# Patient Record
Sex: FEMALE | Race: WHITE | NOT HISPANIC OR LATINO | Employment: OTHER | RURAL
[De-identification: names, ages, dates, MRNs, and addresses within clinical notes are randomized per-mention and may not be internally consistent; named-entity substitution may affect disease eponyms.]

---

## 2022-05-17 ENCOUNTER — OFFICE VISIT (OUTPATIENT)
Dept: FAMILY MEDICINE CLINIC | Facility: CLINIC | Age: 67
End: 2022-05-17

## 2022-05-17 VITALS
TEMPERATURE: 97.8 F | HEIGHT: 63 IN | OXYGEN SATURATION: 99 % | HEART RATE: 54 BPM | DIASTOLIC BLOOD PRESSURE: 68 MMHG | RESPIRATION RATE: 20 BRPM | WEIGHT: 129.4 LBS | BODY MASS INDEX: 22.93 KG/M2 | SYSTOLIC BLOOD PRESSURE: 130 MMHG

## 2022-05-17 DIAGNOSIS — Z76.89 ENCOUNTER TO ESTABLISH CARE: Primary | ICD-10-CM

## 2022-05-17 DIAGNOSIS — E03.9 HYPOTHYROIDISM, UNSPECIFIED TYPE: ICD-10-CM

## 2022-05-17 DIAGNOSIS — M06.9 RHEUMATOID ARTHRITIS, INVOLVING UNSPECIFIED SITE, UNSPECIFIED WHETHER RHEUMATOID FACTOR PRESENT: ICD-10-CM

## 2022-05-17 DIAGNOSIS — I49.9 IRREGULAR HEART BEAT: ICD-10-CM

## 2022-05-17 PROCEDURE — 93005 ELECTROCARDIOGRAM TRACING: CPT | Performed by: NURSE PRACTITIONER

## 2022-05-17 PROCEDURE — 93010 ELECTROCARDIOGRAM REPORT: CPT | Performed by: INTERNAL MEDICINE

## 2022-05-17 PROCEDURE — 99204 OFFICE O/P NEW MOD 45 MIN: CPT | Performed by: NURSE PRACTITIONER

## 2022-05-17 RX ORDER — FUROSEMIDE 40 MG/1
40 TABLET ORAL DAILY
Status: ON HOLD | COMMUNITY
Start: 2021-09-27 | End: 2022-10-12

## 2022-05-17 RX ORDER — CETIRIZINE HYDROCHLORIDE 10 MG/1
10 TABLET ORAL DAILY
COMMUNITY

## 2022-05-17 RX ORDER — LEVOTHYROXINE SODIUM 0.07 MG/1
75 TABLET ORAL DAILY
COMMUNITY
Start: 2022-02-24

## 2022-05-17 RX ORDER — MULTIPLE VITAMINS W/ MINERALS TAB 9MG-400MCG
1 TAB ORAL DAILY
COMMUNITY

## 2022-05-18 LAB
QT INTERVAL: 430 MS
QTC INTERVAL: 499 MS

## 2022-06-15 PROBLEM — I49.9 IRREGULAR HEART BEAT: Status: ACTIVE | Noted: 2022-06-15

## 2022-06-15 PROBLEM — E03.9 HYPOTHYROIDISM: Status: ACTIVE | Noted: 2022-06-15

## 2022-06-15 PROBLEM — M06.9 RHEUMATOID ARTHRITIS: Status: ACTIVE | Noted: 2022-06-15

## 2022-07-01 ENCOUNTER — LAB (OUTPATIENT)
Dept: LAB | Facility: HOSPITAL | Age: 67
End: 2022-07-01

## 2022-07-01 ENCOUNTER — OFFICE VISIT (OUTPATIENT)
Dept: CARDIOLOGY | Facility: CLINIC | Age: 67
End: 2022-07-01

## 2022-07-01 VITALS
SYSTOLIC BLOOD PRESSURE: 134 MMHG | DIASTOLIC BLOOD PRESSURE: 72 MMHG | HEIGHT: 63 IN | OXYGEN SATURATION: 100 % | WEIGHT: 130.4 LBS | BODY MASS INDEX: 23.11 KG/M2 | HEART RATE: 88 BPM

## 2022-07-01 DIAGNOSIS — I49.1 PAC (PREMATURE ATRIAL CONTRACTION): ICD-10-CM

## 2022-07-01 DIAGNOSIS — I49.3 PVC (PREMATURE VENTRICULAR CONTRACTION): ICD-10-CM

## 2022-07-01 DIAGNOSIS — I35.0 NONRHEUMATIC AORTIC VALVE STENOSIS: ICD-10-CM

## 2022-07-01 DIAGNOSIS — I49.9 IRREGULAR HEART BEAT: Primary | ICD-10-CM

## 2022-07-01 DIAGNOSIS — I34.0 NONRHEUMATIC MITRAL VALVE REGURGITATION: ICD-10-CM

## 2022-07-01 LAB
ANION GAP SERPL CALCULATED.3IONS-SCNC: 11 MMOL/L (ref 5–15)
BUN SERPL-MCNC: 7 MG/DL (ref 8–23)
BUN/CREAT SERPL: 11.1 (ref 7–25)
CALCIUM SPEC-SCNC: 8 MG/DL (ref 8.6–10.5)
CHLORIDE SERPL-SCNC: 107 MMOL/L (ref 98–107)
CO2 SERPL-SCNC: 22 MMOL/L (ref 22–29)
CREAT SERPL-MCNC: 0.63 MG/DL (ref 0.57–1)
EGFRCR SERPLBLD CKD-EPI 2021: 96.2 ML/MIN/1.73
GLUCOSE SERPL-MCNC: 108 MG/DL (ref 65–99)
POTASSIUM SERPL-SCNC: 3.6 MMOL/L (ref 3.5–5.2)
SODIUM SERPL-SCNC: 140 MMOL/L (ref 136–145)
TSH SERPL DL<=0.05 MIU/L-ACNC: 0.51 UIU/ML (ref 0.27–4.2)

## 2022-07-01 PROCEDURE — 80048 BASIC METABOLIC PNL TOTAL CA: CPT | Performed by: INTERNAL MEDICINE

## 2022-07-01 PROCEDURE — 99204 OFFICE O/P NEW MOD 45 MIN: CPT | Performed by: INTERNAL MEDICINE

## 2022-07-01 PROCEDURE — 84443 ASSAY THYROID STIM HORMONE: CPT | Performed by: INTERNAL MEDICINE

## 2022-07-01 PROCEDURE — 36415 COLL VENOUS BLD VENIPUNCTURE: CPT | Performed by: INTERNAL MEDICINE

## 2022-07-01 NOTE — PROGRESS NOTES
[facility]  OFFICE NOTE    Cardiovascular Medicine  Roque Carrillo M.D., [facility]         Jolie Maravilla, APRN  [address] [place] [address]    Thank you for asking me to see Umer Frazier for irregular heart beat.    History of Present Illness    Umer Frazier is a 67 y.o. female who presents for consultation today.  She has hypothyroidism, anemia and rheumatoid arthritis per prior documentation.    The patient was referred to our office for further evaluation of irregular heartbeats.  She is currently living with her nephew; [place] in with him ([place] different state[place] in April because she was not able to live independently.  It seems that she was having multiple falls.  Upon my conversation with her today, she reports having multiple falls in her apartment.  She does not recall the exact circumstances of these falls, she would typically wake up on the floor and would not know why she was lying on the floor.  She has not had any such falls or episodes of loss of consciousness while she has been living with her nephew.    She denies having any prior history of coronary stents, heart attacks, open heart surgery, pacemaker/defibrillator placement or heart rhythm problems.  She was told that she had a heart murmur in the past.  Her dad had congestive heart failure.    At present, the patient is able to walk on her own (without assistance with a cane or walker) in her nephew's house.  She denies having any chest discomfort during routine day-to-day activities.  She has had chronic exertional dyspnea, she is not sure if this has gotten worse.  She has not had any PND or orthopnea.  She has had bilateral lower extremity swelling.  She also reports occasional episodes of irregular heartbeats/heart fluttering.  She does not think that she has lost consciousness before; but is not sure.    She had a transthoracic echocardiogram in April 2022 at [facility] in [place].  According to the report, LVEF was 65 to 70%, mild LVH, moderate left atrial dilatation, mild to moderate MR, moderate to severe AS, mild TR and mildly elevated RVSP was noted (by report).    Review of Systems - ROS  Constitution: Negative for weight gain and weight loss.   HENT: Negative for congestion.    Eyes: Negative for blurred vision.   Cardiovascular: As mentioned above  Respiratory: Negative for cough and hemoptysis.    Endocrine: Negative for polydipsia and polyuria.   Hematologic/Lymphatic: Negative for bleeding problem.   Skin: Negative for flushing.   Musculoskeletal: Negative for neck pain and stiffness.   Gastrointestinal: Negative for abdominal pain, nausea and vomiting.   Genitourinary: Negative for dysuria and hematuria.   Neurological: Negative for focal weakness and numbness.   Psychiatric/Behavioral: Negative for altered mental status.      All other systems were reviewed and were negative.    Past Medical History:   Diagnosis Date   • Arthritis    • Hypothyroidism        Family History:  Family history is unknown by patient.    Social History: Drinks alcohol occasionally.  She smokes marijuana every once in a while.  She quit smoking almost 30 years ago.   reports that she quit smoking about 29 years ago. She has never used smokeless tobacco. She reports current alcohol use. She reports previous drug use.    Allergies:  Allergies   Allergen Reactions   • Folic Acid Unknown - Low Severity     Other reaction(s): flu symtoms         Current Outpatient Medications:   •  cetirizine (zyrTEC) 10 MG tablet, Take 10 mg by mouth Daily., Disp: , Rfl:   •  levothyroxine (SYNTHROID, LEVOTHROID) 75 MCG tablet, Take 75 mcg by mouth., Disp: , Rfl:   •  methotrexate 2.5 MG tablet, methotrexate sodium 2.5 mg tablet, Disp: , Rfl:   •  Multiple Vitamins-Minerals (MACULAR HEALTH FORMULA PO), Take  by mouth., Disp: , Rfl:   •  multivitamin with minerals tablet tablet, Take 1 tablet by mouth Daily., Disp: ,  "Rfl:   •  furosemide (LASIX) 40 MG tablet, Take 40 mg by mouth Daily., Disp: , Rfl:     Physical Exam:  Vitals:    07/01/22 1009   BP: 134/72   BP Location: Left arm   Patient Position: Sitting   Cuff Size: Adult   Pulse: 88   SpO2: 100%   Weight: 59.1 kg (130 lb 6.4 oz)   Height: 160 cm (63\")   PainSc: 0-No pain     Current Pain Level: none  Pulse Ox: Normal on room air, seated in wheelchair  General: alert, appears stated age and cooperative     Body Habitus: well-nourished    HEENT: Head: Normocephalic, no lesions, without obvious abnormality.     Neuro: alert, oriented x3  JVP: Volume/Pulsation: Normal.  Normal waveforms.   Appropriate inspiratory decrease.    Carotid Exam: no bruit normal pulsation bilaterally    Respirations: no increased work of breathing   Chest:  Normal    Pulmonary:No crackles or wheezes   Heart rate: normal    Heart Rhythm: regular with periods of irregularity     Heart Sounds: S1: normal  S2: Soft  Systolic murmur heard throughout the precordium; radiation to carotids noted   Abdomen:   Appearance: normal .  Palpation: Soft, bowel sounds positive in all four quadrants  Extremity: 1+ bilateral pitting edema over the legs.  Deformities involving bilateral fingers and toes of bilateral lower extremities noted      DATA REVIEWED:     EKG. I personally reviewed and interpreted the EKG.  normal sinus rhythm, prolonged QT, nonspecific ST abnormality, PACs, PVCs on 5/17/2022    ECG/EMG Results (all)     None        ---------------------------------------------------  TTE/RAMU:      -----------------------------------------------------  CXR/Imaging:   Imaging Results (Most Recent)     None          -----------------------------------------------------  CT:   No radiology results for the last 30 days.    ----------------------------------------------------      --------------------------------------------------------------------------------------------------  LABS:     The CVD Risk score " (Sary et al., 2008) failed to calculate for the following reasons:    Cannot find a previous HDL lab    Cannot find a previous total cholesterol lab         Lab Results   Component Value Date    AST 42 (H) 06/11/2018    ALT 30 06/11/2018     No results found for: WBC, HGB, HCT, MCV, PLT  No results found for: CHOL, CHLPL, TRIG, HDL, LDL, LDLDIRECT  No results found for: TSH, M2LBNDM, R9SLIDB, THYROIDAB  No results found for: CKTOTAL, CKMB, CKMBINDEX, TROPONINI, TROPONINT  No results found for: HGBA1C  No results found for: DDIMER  Lab Results   Component Value Date    ALT 30 06/11/2018     No results found for: HGBA1C  No results found for: GLUF, MICROALBUR, CREATININE  No results found for: IRON, TIBC, FERRITIN  No results found for: INR, PROTIME    [unfilled]    1. Irregular heart beat  - ECG 12 Lead  - Adult Transthoracic Echo Complete W/ Cont if Necessary Per Protocol; Future  - Mobile Cardiac Outpatient Telemetry; Future  - Basic Metabolic Panel  - TSH    2. PAC (premature atrial contraction)  - Adult Transthoracic Echo Complete W/ Cont if Necessary Per Protocol; Future  - Mobile Cardiac Outpatient Telemetry; Future  - Basic Metabolic Panel  - TSH    3. PVC (premature ventricular contraction)  - Adult Transthoracic Echo Complete W/ Cont if Necessary Per Protocol; Future  - Mobile Cardiac Outpatient Telemetry; Future  - Basic Metabolic Panel  - TSH    4. Nonrheumatic aortic valve stenosis  - Adult Transthoracic Echo Complete W/ Cont if Necessary Per Protocol; Future    5. Nonrheumatic mitral valve regurgitation  - Adult Transthoracic Echo Complete W/ Cont if Necessary Per Protocol; Future    The patient is a 67-year-old female with hypothyroidism, anemia and rheumatoid arthritis who was referred to our office for further evaluation of irregular heartbeats.  The patient reports chronic exertional dyspnea, irregular heartbeat/heart flutters and bilateral lower extremity edema.  She has also had  multiple falls over the past few months (thought to be related to weakness or deformities from arthritis); these have fortunately not recurred since she has moved in with her nephew over the past couple of months.  Recent clinic ECG showed sinus rhythm with PACs and PVCs.  QT was prolonged.  She had a transthoracic echocardiogram [facility] in [facility] recently.  According to the report, LVEF was 65 to 70%, mild LVH, moderate left atrial dilatation, mild to moderate MR, moderate to severe AS, mild TR and mildly elevated RVSP was noted (by report).  Given the findings of PACs and PVCs on recent ECG, will obtain serum BMP and TSH levels today.  Repeat transthoracic echocardiography to assess her valvular heart disease. Some of her symptoms may be occurring from aortic valve stenosis or underlying hemodynamically significant cardiac arrhythmias.  We will also place a 2-week live cardiac monitor to look for hemodynamically significant arrhythmias.    Prevention:  BMI is within normal parameters. No other follow-up for BMI required.      Umer Frazier  reports that she quit smoking about 29 years ago. She has never used smokeless tobacco.. I have educated her on continued tobacco cessation.      Return in about 2 months (around 9/1/2022).            Electronically signed by Rqoue Carrillo MD on 07/01/22 at 10:09 CDT

## 2022-07-05 ENCOUNTER — TELEPHONE (OUTPATIENT)
Dept: CARDIOLOGY | Facility: CLINIC | Age: 67
End: 2022-07-05

## 2022-07-05 NOTE — TELEPHONE ENCOUNTER
Called patient. Informed patient of test results. Patient voiced their understandings.       ----- Message from Roque Carrillo MD sent at 7/2/2022  6:46 PM CDT -----  Serum TSH was normal. Serum Cr and GFR also look good.

## 2022-07-27 ENCOUNTER — TELEPHONE (OUTPATIENT)
Dept: CARDIOLOGY | Facility: CLINIC | Age: 67
End: 2022-07-27

## 2022-07-27 NOTE — TELEPHONE ENCOUNTER
Unable to leave        ----- Message from Roque Carrillo MD sent at 7/22/2022  6:53 PM CDT -----  Heart monitor showed frequent PACs and PVCs.   Will follow on the results of echocardiogram to determine further steps.   Any chance we could pre-pone her echocardiogram?

## 2022-08-29 ENCOUNTER — TELEPHONE (OUTPATIENT)
Dept: CARDIOLOGY | Facility: CLINIC | Age: 67
End: 2022-08-29

## 2022-08-29 NOTE — TELEPHONE ENCOUNTER
Called patient. Informed patient of test results. Patient voiced their understandings.       ----- Message from Roque Carrillo MD sent at 8/27/2022  7:33 PM CDT -----  Echocardiogram showed moderate aortic stenosis and mild-moderate mitral regurgitation. Will discuss further at next appt.    
Speaking Coherently

## 2022-09-02 ENCOUNTER — OFFICE VISIT (OUTPATIENT)
Dept: CARDIOLOGY | Facility: CLINIC | Age: 67
End: 2022-09-02

## 2022-09-02 ENCOUNTER — PREP FOR SURGERY (OUTPATIENT)
Dept: OTHER | Facility: HOSPITAL | Age: 67
End: 2022-09-02

## 2022-09-02 VITALS
WEIGHT: 130 LBS | HEART RATE: 51 BPM | DIASTOLIC BLOOD PRESSURE: 72 MMHG | BODY MASS INDEX: 23.04 KG/M2 | HEIGHT: 63 IN | SYSTOLIC BLOOD PRESSURE: 126 MMHG | OXYGEN SATURATION: 97 %

## 2022-09-02 DIAGNOSIS — I35.0 NONRHEUMATIC AORTIC VALVE STENOSIS: ICD-10-CM

## 2022-09-02 DIAGNOSIS — I49.9 IRREGULAR HEART BEAT: Primary | ICD-10-CM

## 2022-09-02 DIAGNOSIS — I49.3 PVC (PREMATURE VENTRICULAR CONTRACTION): ICD-10-CM

## 2022-09-02 DIAGNOSIS — I34.0 NONRHEUMATIC MITRAL VALVE REGURGITATION: ICD-10-CM

## 2022-09-02 DIAGNOSIS — I35.0 NONRHEUMATIC AORTIC VALVE STENOSIS: Primary | ICD-10-CM

## 2022-09-02 DIAGNOSIS — I47.29 NSVT (NONSUSTAINED VENTRICULAR TACHYCARDIA): ICD-10-CM

## 2022-09-02 DIAGNOSIS — R06.09 DYSPNEA ON EXERTION: ICD-10-CM

## 2022-09-02 DIAGNOSIS — I49.1 PAC (PREMATURE ATRIAL CONTRACTION): ICD-10-CM

## 2022-09-02 PROCEDURE — 99214 OFFICE O/P EST MOD 30 MIN: CPT | Performed by: INTERNAL MEDICINE

## 2022-09-02 RX ORDER — SODIUM CHLORIDE 9 MG/ML
50 INJECTION, SOLUTION INTRAVENOUS ONCE
Status: CANCELLED | OUTPATIENT
Start: 2022-09-08 | End: 2022-09-02

## 2022-09-02 RX ORDER — ASPIRIN 325 MG
325 TABLET ORAL ONCE
Status: CANCELLED | OUTPATIENT
Start: 2022-09-08 | End: 2022-09-02

## 2022-09-02 RX ORDER — SODIUM CHLORIDE 0.9 % (FLUSH) 0.9 %
10 SYRINGE (ML) INJECTION AS NEEDED
Status: CANCELLED | OUTPATIENT
Start: 2022-09-08

## 2022-09-02 RX ORDER — ASPIRIN 81 MG/1
81 TABLET ORAL DAILY
Status: CANCELLED | OUTPATIENT
Start: 2022-09-08

## 2022-09-02 RX ORDER — SODIUM CHLORIDE 0.9 % (FLUSH) 0.9 %
3 SYRINGE (ML) INJECTION EVERY 12 HOURS SCHEDULED
Status: CANCELLED | OUTPATIENT
Start: 2022-09-08

## 2022-09-02 NOTE — H&P (VIEW-ONLY)
[facility]  OFFICE NOTE    Cardiovascular Medicine  Roque Carrillo M.D., [facility]         No referring provider defined for this encounter.    History of Present Illness    Umer Frazier is a 67 y.o. female who presents for consultation today.  She has hypothyroidism, anemia and rheumatoid arthritis per prior documentation.    The patient was referred to our office for further evaluation of irregular heartbeats.  She is currently living with her nephew; moved in with him ([place] in April because she was not able to live independently.  It seems that she was having multiple falls.  Upon my conversation with her today, she reports having multiple falls in her apartment.  She does not recall the exact circumstances of these falls, she would typically wake up on the floor and would not know why she was lying on the floor.  She has not had any such falls or episodes of loss of consciousness while she has been living with her nephew.    She denies having any prior history of coronary stents, heart attacks, open heart surgery, pacemaker/defibrillator placement or heart rhythm problems.  She was told that she had a heart murmur in the past.  Her dad had congestive heart failure.    At present, the patient is able to walk on her own (without assistance with a cane or walker) in her nephew's house.  She denies having any chest discomfort during routine day-to-day activities.  She has had chronic exertional dyspnea, she is not sure if this has gotten worse.  She has not had any PND or orthopnea.  She has had bilateral lower extremity swelling.  She also reports occasional episodes of irregular heartbeats/heart fluttering.  She does not think that she has lost consciousness before; but is not sure.    She had a transthoracic echocardiogram in April 2022 at [facility] in [facility].  According to the report, LVEF was 65 to 70%, mild LVH, moderate left atrial  dilatation, mild to moderate MR, moderate to severe AS, mild TR and mildly elevated RVSP was noted (by report).    9/2/2022:  She presented today for a follow-up visit accompanied by her nephew.  Results of recent transthoracic echocardiogram and heart monitor were extensively reviewed with them.  She continues to have significant exertional dyspnea.  She quit smoking in 1990s.  She has not had any chest discomfort.  Since our last visit, she has not had any palpitations, presyncope or syncope.  She also denies any any PND or orthopnea.  She does report feeling off balance frequently, but has not had any falls since she moved in with her nephew.    Review of Systems - ROS  Constitution: Negative for weight gain and weight loss.   HENT: Negative for congestion.    Eyes: Negative for blurred vision.   Cardiovascular: As mentioned above  Respiratory: Negative for cough and hemoptysis.    Endocrine: Negative for polydipsia and polyuria.   Hematologic/Lymphatic: Negative for bleeding problem.   Skin: Negative for flushing.   Musculoskeletal: Negative for neck pain and stiffness.   Gastrointestinal: Negative for abdominal pain, nausea and vomiting.   Genitourinary: Negative for dysuria and hematuria.   Neurological: Negative for focal weakness and numbness.   Psychiatric/Behavioral: Negative for altered mental status.      All other systems were reviewed and were negative.    Past Medical History:   Diagnosis Date   • Arthritis    • Hypothyroidism        Family History:  Family history is unknown by patient.    Social History: Drinks alcohol occasionally.  She smokes marijuana every once in a while.  She quit smoking almost 30 years ago.   reports that she quit smoking about 29 years ago. She has never used smokeless tobacco. She reports current alcohol use. She reports previous drug use.    Allergies:  Allergies   Allergen Reactions   • Folic Acid Unknown - Low Severity     Other reaction(s): flu symtoms         Current  "Outpatient Medications:   •  cetirizine (zyrTEC) 10 MG tablet, Take 10 mg by mouth Daily., Disp: , Rfl:   •  furosemide (LASIX) 40 MG tablet, Take 40 mg by mouth Daily., Disp: , Rfl:   •  levothyroxine (SYNTHROID, LEVOTHROID) 75 MCG tablet, Take 75 mcg by mouth., Disp: , Rfl:   •  methotrexate 2.5 MG tablet, methotrexate sodium 2.5 mg tablet, Disp: , Rfl:   •  Multiple Vitamins-Minerals (MACULAR HEALTH FORMULA PO), Take  by mouth., Disp: , Rfl:   •  multivitamin with minerals tablet tablet, Take 1 tablet by mouth Daily., Disp: , Rfl:     Physical Exam:  Vitals:    09/02/22 0938   BP: 126/72   BP Location: Left arm   Patient Position: Sitting   Cuff Size: Adult   Pulse: 51   SpO2: 97%   Weight: 59 kg (130 lb)   Height: 160 cm (63\")   PainSc: 0-No pain     Current Pain Level: none  Pulse Ox: Normal on room air, seated in wheelchair  General: alert, appears stated age and cooperative     Body Habitus: well-nourished    HEENT: Head: Normocephalic, no lesions, without obvious abnormality.     Neuro: alert, oriented x3  JVP: Volume/Pulsation: Normal.  Normal waveforms.   Appropriate inspiratory decrease.    Carotid Exam: no bruit normal pulsation bilaterally    Respirations: no increased work of breathing   Chest:  Normal    Pulmonary:No crackles or wheezes   Heart rate: normal    Heart Rhythm: regular with periods of irregularity     Heart Sounds: S1: normal  S2: Soft  Systolic murmur heard throughout the precordium; radiation to carotids noted   Abdomen:   Appearance: normal .  Palpation: Soft, bowel sounds positive in all four quadrants  Extremity: 1+ bilateral pitting edema over the legs.  Deformities involving bilateral fingers and toes of bilateral lower extremities noted      DATA REVIEWED:     EKG. I personally reviewed and interpreted the EKG.  normal sinus rhythm, prolonged QT, nonspecific ST abnormality, PACs, PVCs on 5/17/2022    ECG/EMG Results (all)     None    "     ---------------------------------------------------  TTE/RAMU:  Results for orders placed in visit on 08/26/22    Adult Transthoracic Echo Complete W/ Cont if Necessary Per Protocol    Interpretation Summary  · Calculated left ventricular 3D EF = 55% Estimated left ventricular EF = 55% Left ventricular ejection fraction appears to be 51 - 55%. Left ventricular systolic function is normal.  · Left ventricular wall thickness is consistent with mild concentric hypertrophy.  · Left atrial volume is mildly increased.  · Moderate aortic valve stenosis is present. Vmax 3.2 m/s, mean gradient 21 mm Hg.  · Mild to moderate mitral valve regurgitation is present.      -----------------------------------------------------  CXR/Imaging:   Imaging Results (Most Recent)     None          -----------------------------------------------------  CT:   No radiology results for the last 30 days.    ----------------------------------------------------      --------------------------------------------------------------------------------------------------  LABS:     The CVD Risk score (Sary et al., 2008) failed to calculate for the following reasons:    Cannot find a previous HDL lab    Cannot find a previous total cholesterol lab         Lab Results   Component Value Date    GLUCOSE 108 (H) 07/01/2022    BUN 7 (L) 07/01/2022    CREATININE 0.63 07/01/2022    BCR 11.1 07/01/2022    K 3.6 07/01/2022    CO2 22.0 07/01/2022    CALCIUM 8.0 (L) 07/01/2022    AST 42 (H) 06/11/2018    ALT 30 06/11/2018     No results found for: WBC, HGB, HCT, MCV, PLT  No results found for: CHOL, CHLPL, TRIG, HDL, LDL, LDLDIRECT  Lab Results   Component Value Date    TSH 0.505 07/01/2022     No results found for: CKTOTAL, CKMB, CKMBINDEX, TROPONINI, TROPONINT  No results found for: HGBA1C  No results found for: DDIMER  Lab Results   Component Value Date    ALT 30 06/11/2018     No results found for: HGBA1C  Lab Results   Component Value Date     CREATININE 0.63 07/01/2022     No results found for: IRON, TIBC, FERRITIN  No results found for: INR, PROTIME      [unfilled]    Diagnoses and all orders for this visit:    1. Irregular heart beat (Primary)    2. PAC (premature atrial contraction)    3. PVC (premature ventricular contraction)  -     Case Request Cath Lab: Left and Right Heart Cath    4. Nonrheumatic aortic valve stenosis  -     Case Request Cath Lab: Left and Right Heart Cath    5. Nonrheumatic mitral valve regurgitation  -     Case Request Cath Lab: Left and Right Heart Cath    6. Dyspnea on exertion  -     Case Request Cath Lab: Left and Right Heart Cath    7. NSVT (nonsustained ventricular tachycardia) (HCC)  -     Case Request Cath Lab: Left and Right Heart Cath         The patient is a 67-year-old female with hypothyroidism, anemia and rheumatoid arthritis who was initially evaluated in our office in July 2022 for evaluation of irregular heartbeats.  The patient reported chronic exertional dyspnea, irregular heartbeat/heart flutters and bilateral lower extremity edema.  She has also had multiple falls over the past few months (thought to be related to weakness or deformities from arthritis); these have fortunately not recurred since she has moved in with her nephew over the past few months.  Clinic ECG in May 2022 showed sinus rhythm with PACs and PVCs.  QT appeared prolonged.  She had a transthoracic echocardiogram [facility] in [facility] earlier this year.  According to the report, LVEF was 65 to 70%, mild LVH, moderate left atrial dilatation, mild to moderate MR, moderate to severe AS, mild TR and mildly elevated RVSP was noted (by report).  We proceeded with a 12-day MCT study in July 2022.  Frequent PACs (14% burden), frequent PVCs (5% burden) and one episode of nonsustained ventricular tachycardia (4 beats long) were noted.  Serum TSH was normal in July 2022.  Transthoracic echocardiogram in August 2022 showed normal LV  systolic function, mild concentric LVH, moderate aortic valve stenosis, mild to moderate mitral valve regurgitation and possible mild mitral stenosis.  Given the presence of frequent PVCs, runs of nonsustained VT, underlying valvular heart disease and dyspnea on exertion, definitive evaluation with LHC/RHC/coronary angiography is recommended.  The risks/benefits/alternatives of this procedure were extensively reviewed with the patient and her nephew in clinic today and they agreed to proceed after understanding these.  She may need referral to cardiac EP in case she does not have any significant underlying CAD.    Prevention:  BMI is within normal parameters. No other follow-up for BMI required.      Umer Frazier  reports that she quit smoking about 29 years ago. She has never used smokeless tobacco.. I have educated her on continued tobacco cessation.      Return in about 3 months (around 12/2/2022).            Electronically signed by Roque Carrillo MD on 09/02/22 at 10:09 CDT

## 2022-09-02 NOTE — PROGRESS NOTES
[facility]  OFFICE NOTE    Cardiovascular Medicine  Roque Carrillo M.D., [facility]         No referring provider defined for this encounter.    History of Present Illness    Umer Frazier is a 67 y.o. female who presents for consultation today.  She has hypothyroidism, anemia and rheumatoid arthritis per prior documentation.    The patient was referred to our office for further evaluation of irregular heartbeats.  She is currently living with her nephew; [place] in [place] ([place] in April because she was not able to live independently.  It seems that she was having multiple falls.  Upon my conversation with her today, she reports having multiple falls in her apartment.  She does not recall the exact circumstances of these falls, she would typically wake up on the floor and would not know why she was lying on the floor.  She has not had any such falls or episodes of loss of consciousness while she has been living with her nephew.    She denies having any prior history of coronary stents, heart attacks, open heart surgery, pacemaker/defibrillator placement or heart rhythm problems.  She was told that she had a heart murmur in the past.  Her dad had congestive heart failure.    At present, the patient is able to walk on her own (without assistance with a cane or walker) in her nephew's house.  She denies having any chest discomfort during routine day-to-day activities.  She has had chronic exertional dyspnea, she is not sure if this has gotten worse.  She has not had any PND or orthopnea.  She has had bilateral lower extremity swelling.  She also reports occasional episodes of irregular heartbeats/heart fluttering.  She does not think that she has lost consciousness before; but is not sure.    She had a transthoracic echocardiogram in April 2022 at [facility] in [facility].  According to the report, LVEF was 65 to 70%, mild LVH, moderate left atrial  dilatation, mild to moderate MR, moderate to severe AS, mild TR and mildly elevated RVSP was noted (by report).    9/2/2022:  She presented today for a follow-up visit accompanied by her nephew.  Results of recent transthoracic echocardiogram and heart monitor were extensively reviewed with them.  She continues to have significant exertional dyspnea.  She quit smoking in 1990s.  She has not had any chest discomfort.  Since our last visit, she has not had any palpitations, presyncope or syncope.  She also denies any any PND or orthopnea.  She does report feeling off balance frequently, but has not had any falls since she moved in with her nephew.    Review of Systems - ROS  Constitution: Negative for weight gain and weight loss.   HENT: Negative for congestion.    Eyes: Negative for blurred vision.   Cardiovascular: As mentioned above  Respiratory: Negative for cough and hemoptysis.    Endocrine: Negative for polydipsia and polyuria.   Hematologic/Lymphatic: Negative for bleeding problem.   Skin: Negative for flushing.   Musculoskeletal: Negative for neck pain and stiffness.   Gastrointestinal: Negative for abdominal pain, nausea and vomiting.   Genitourinary: Negative for dysuria and hematuria.   Neurological: Negative for focal weakness and numbness.   Psychiatric/Behavioral: Negative for altered mental status.      All other systems were reviewed and were negative.    Past Medical History:   Diagnosis Date   • Arthritis    • Hypothyroidism        Family History:  Family history is unknown by patient.    Social History: Drinks alcohol occasionally.  She smokes marijuana every once in a while.  She quit smoking almost 30 years ago.   reports that she quit smoking about 29 years ago. She has never used smokeless tobacco. She reports current alcohol use. She reports previous drug use.    Allergies:  Allergies   Allergen Reactions   • Folic Acid Unknown - Low Severity     Other reaction(s): flu symtoms         Current  "Outpatient Medications:   •  cetirizine (zyrTEC) 10 MG tablet, Take 10 mg by mouth Daily., Disp: , Rfl:   •  furosemide (LASIX) 40 MG tablet, Take 40 mg by mouth Daily., Disp: , Rfl:   •  levothyroxine (SYNTHROID, LEVOTHROID) 75 MCG tablet, Take 75 mcg by mouth., Disp: , Rfl:   •  methotrexate 2.5 MG tablet, methotrexate sodium 2.5 mg tablet, Disp: , Rfl:   •  Multiple Vitamins-Minerals (MACULAR HEALTH FORMULA PO), Take  by mouth., Disp: , Rfl:   •  multivitamin with minerals tablet tablet, Take 1 tablet by mouth Daily., Disp: , Rfl:     Physical Exam:  Vitals:    09/02/22 0938   BP: 126/72   BP Location: Left arm   Patient Position: Sitting   Cuff Size: Adult   Pulse: 51   SpO2: 97%   Weight: 59 kg (130 lb)   Height: 160 cm (63\")   PainSc: 0-No pain     Current Pain Level: none  Pulse Ox: Normal on room air, seated in wheelchair  General: alert, appears stated age and cooperative     Body Habitus: well-nourished    HEENT: Head: Normocephalic, no lesions, without obvious abnormality.     Neuro: alert, oriented x3  JVP: Volume/Pulsation: Normal.  Normal waveforms.   Appropriate inspiratory decrease.    Carotid Exam: no bruit normal pulsation bilaterally    Respirations: no increased work of breathing   Chest:  Normal    Pulmonary:No crackles or wheezes   Heart rate: normal    Heart Rhythm: regular with periods of irregularity     Heart Sounds: S1: normal  S2: Soft  Systolic murmur heard throughout the precordium; radiation to carotids noted   Abdomen:   Appearance: normal .  Palpation: Soft, bowel sounds positive in all four quadrants  Extremity: 1+ bilateral pitting edema over the legs.  Deformities involving bilateral fingers and toes of bilateral lower extremities noted      DATA REVIEWED:     EKG. I personally reviewed and interpreted the EKG.  normal sinus rhythm, prolonged QT, nonspecific ST abnormality, PACs, PVCs on 5/17/2022    ECG/EMG Results (all)     None    "     ---------------------------------------------------  TTE/RAMU:  Results for orders placed in visit on 08/26/22    Adult Transthoracic Echo Complete W/ Cont if Necessary Per Protocol    Interpretation Summary  · Calculated left ventricular 3D EF = 55% Estimated left ventricular EF = 55% Left ventricular ejection fraction appears to be 51 - 55%. Left ventricular systolic function is normal.  · Left ventricular wall thickness is consistent with mild concentric hypertrophy.  · Left atrial volume is mildly increased.  · Moderate aortic valve stenosis is present. Vmax 3.2 m/s, mean gradient 21 mm Hg.  · Mild to moderate mitral valve regurgitation is present.      -----------------------------------------------------  CXR/Imaging:   Imaging Results (Most Recent)     None          -----------------------------------------------------  CT:   No radiology results for the last 30 days.    ----------------------------------------------------      --------------------------------------------------------------------------------------------------  LABS:     The CVD Risk score (Sary et al., 2008) failed to calculate for the following reasons:    Cannot find a previous HDL lab    Cannot find a previous total cholesterol lab         Lab Results   Component Value Date    GLUCOSE 108 (H) 07/01/2022    BUN 7 (L) 07/01/2022    CREATININE 0.63 07/01/2022    BCR 11.1 07/01/2022    K 3.6 07/01/2022    CO2 22.0 07/01/2022    CALCIUM 8.0 (L) 07/01/2022    AST 42 (H) 06/11/2018    ALT 30 06/11/2018     No results found for: WBC, HGB, HCT, MCV, PLT  No results found for: CHOL, CHLPL, TRIG, HDL, LDL, LDLDIRECT  Lab Results   Component Value Date    TSH 0.505 07/01/2022     No results found for: CKTOTAL, CKMB, CKMBINDEX, TROPONINI, TROPONINT  No results found for: HGBA1C  No results found for: DDIMER  Lab Results   Component Value Date    ALT 30 06/11/2018     No results found for: HGBA1C  Lab Results   Component Value Date     CREATININE 0.63 07/01/2022     No results found for: IRON, TIBC, FERRITIN  No results found for: INR, PROTIME      [unfilled]    Diagnoses and all orders for this visit:    1. Irregular heart beat (Primary)    2. PAC (premature atrial contraction)    3. PVC (premature ventricular contraction)  -     Case Request Cath Lab: Left and Right Heart Cath    4. Nonrheumatic aortic valve stenosis  -     Case Request Cath Lab: Left and Right Heart Cath    5. Nonrheumatic mitral valve regurgitation  -     Case Request Cath Lab: Left and Right Heart Cath    6. Dyspnea on exertion  -     Case Request Cath Lab: Left and Right Heart Cath    7. NSVT (nonsustained ventricular tachycardia) (HCC)  -     Case Request Cath Lab: Left and Right Heart Cath         The patient is a 67-year-old female with hypothyroidism, anemia and rheumatoid arthritis who was initially evaluated in our office in July 2022 for evaluation of irregular heartbeats.  The patient reported chronic exertional dyspnea, irregular heartbeat/heart flutters and bilateral lower extremity edema.  She has also had multiple falls over the past few months (thought to be related to weakness or deformities from arthritis); these have fortunately not recurred since she has moved in with her nephew over the past few months.  Clinic ECG in May 2022 showed sinus rhythm with PACs and PVCs.  QT appeared prolonged.  She had a transthoracic echocardiogram [facility] in [facility] earlier this year.  According to the report, LVEF was 65 to 70%, mild LVH, moderate left atrial dilatation, mild to moderate MR, moderate to severe AS, mild TR and mildly elevated RVSP was noted (by report).  We proceeded with a 12-day MCT study in July 2022.  Frequent PACs (14% burden), frequent PVCs (5% burden) and one episode of nonsustained ventricular tachycardia (4 beats long) were noted.  Serum TSH was normal in July 2022.  Transthoracic echocardiogram in August 2022 showed normal LV  systolic function, mild concentric LVH, moderate aortic valve stenosis, mild to moderate mitral valve regurgitation and possible mild mitral stenosis.  Given the presence of frequent PVCs, runs of nonsustained VT, underlying valvular heart disease and dyspnea on exertion, definitive evaluation with LHC/RHC/coronary angiography is recommended.  The risks/benefits/alternatives of this procedure were extensively reviewed with the patient and her nephew in clinic today and they agreed to proceed after understanding these.  She may need referral to cardiac EP in case she does not have any significant underlying CAD.    Prevention:  BMI is within normal parameters. No other follow-up for BMI required.      Umer Frazier  reports that she quit smoking about 29 years ago. She has never used smokeless tobacco.. I have educated her on continued tobacco cessation.      Return in about 3 months (around 12/2/2022).            Electronically signed by Roque Carrillo MD on 09/02/22 at 10:09 CDT

## 2022-09-08 ENCOUNTER — HOSPITAL ENCOUNTER (OUTPATIENT)
Facility: HOSPITAL | Age: 67
Setting detail: HOSPITAL OUTPATIENT SURGERY
Discharge: HOME | End: 2022-09-08
Attending: INTERNAL MEDICINE | Admitting: INTERNAL MEDICINE

## 2022-09-08 VITALS
SYSTOLIC BLOOD PRESSURE: 183 MMHG | WEIGHT: 118.83 LBS | BODY MASS INDEX: 21.05 KG/M2 | HEIGHT: 63 IN | OXYGEN SATURATION: 98 % | RESPIRATION RATE: 20 BRPM | TEMPERATURE: 96.6 F | HEART RATE: 86 BPM | DIASTOLIC BLOOD PRESSURE: 89 MMHG

## 2022-09-08 DIAGNOSIS — I34.0 NONRHEUMATIC MITRAL VALVE REGURGITATION: ICD-10-CM

## 2022-09-08 DIAGNOSIS — I25.119 CORONARY ARTERY DISEASE INVOLVING NATIVE CORONARY ARTERY OF NATIVE HEART WITH ANGINA PECTORIS: Primary | ICD-10-CM

## 2022-09-08 DIAGNOSIS — I35.0 NONRHEUMATIC AORTIC VALVE STENOSIS: ICD-10-CM

## 2022-09-08 DIAGNOSIS — I47.29 NSVT (NONSUSTAINED VENTRICULAR TACHYCARDIA): ICD-10-CM

## 2022-09-08 DIAGNOSIS — I49.3 PVC (PREMATURE VENTRICULAR CONTRACTION): ICD-10-CM

## 2022-09-08 DIAGNOSIS — R06.09 DYSPNEA ON EXERTION: ICD-10-CM

## 2022-09-08 LAB
ANION GAP SERPL CALCULATED.3IONS-SCNC: 8 MMOL/L (ref 5–15)
BUN SERPL-MCNC: 10 MG/DL (ref 8–23)
BUN/CREAT SERPL: 17.2 (ref 7–25)
CALCIUM SPEC-SCNC: 8.4 MG/DL (ref 8.6–10.5)
CHLORIDE SERPL-SCNC: 106 MMOL/L (ref 98–107)
CO2 SERPL-SCNC: 24 MMOL/L (ref 22–29)
CREAT SERPL-MCNC: 0.58 MG/DL (ref 0.57–1)
DEPRECATED RDW RBC AUTO: 60.8 FL (ref 37–54)
EGFRCR SERPLBLD CKD-EPI 2021: 98.1 ML/MIN/1.73
ERYTHROCYTE [DISTWIDTH] IN BLOOD BY AUTOMATED COUNT: 23.9 % (ref 12.3–15.4)
GLUCOSE SERPL-MCNC: 86 MG/DL (ref 65–99)
HCT VFR BLD AUTO: 30.6 % (ref 34–46.6)
HGB BLD-MCNC: 9.5 G/DL (ref 12–15.9)
INR PPP: 1.32 (ref 0.8–1.2)
MCH RBC QN AUTO: 22.7 PG (ref 26.6–33)
MCHC RBC AUTO-ENTMCNC: 31 G/DL (ref 31.5–35.7)
MCV RBC AUTO: 73 FL (ref 79–97)
PLATELET # BLD AUTO: 184 10*3/MM3 (ref 140–450)
PMV BLD AUTO: 10 FL (ref 6–12)
POTASSIUM SERPL-SCNC: 3.5 MMOL/L (ref 3.5–5.2)
PROTHROMBIN TIME: 16.3 SECONDS (ref 11.1–15.3)
RBC # BLD AUTO: 4.19 10*6/MM3 (ref 3.77–5.28)
SODIUM SERPL-SCNC: 138 MMOL/L (ref 136–145)
WBC NRBC COR # BLD: 4.84 10*3/MM3 (ref 3.4–10.8)

## 2022-09-08 PROCEDURE — C1760 CLOSURE DEV, VASC: HCPCS | Performed by: INTERNAL MEDICINE

## 2022-09-08 PROCEDURE — 25010000002 FENTANYL CITRATE (PF) 50 MCG/ML SOLUTION: Performed by: INTERNAL MEDICINE

## 2022-09-08 PROCEDURE — 25010000002 HEPARIN (PORCINE) PER 1000 UNITS: Performed by: INTERNAL MEDICINE

## 2022-09-08 PROCEDURE — 0 IOPAMIDOL PER 1 ML: Performed by: INTERNAL MEDICINE

## 2022-09-08 PROCEDURE — C1894 INTRO/SHEATH, NON-LASER: HCPCS

## 2022-09-08 PROCEDURE — C1887 CATHETER, GUIDING: HCPCS | Performed by: INTERNAL MEDICINE

## 2022-09-08 PROCEDURE — C1769 GUIDE WIRE: HCPCS

## 2022-09-08 PROCEDURE — C9600 PERC DRUG-EL COR STENT SING: HCPCS | Performed by: INTERNAL MEDICINE

## 2022-09-08 PROCEDURE — 25010000002 MIDAZOLAM PER 1 MG: Performed by: INTERNAL MEDICINE

## 2022-09-08 PROCEDURE — 99152 MOD SED SAME PHYS/QHP 5/>YRS: CPT | Performed by: INTERNAL MEDICINE

## 2022-09-08 PROCEDURE — 93005 ELECTROCARDIOGRAM TRACING: CPT | Performed by: INTERNAL MEDICINE

## 2022-09-08 PROCEDURE — 93454 CORONARY ARTERY ANGIO S&I: CPT | Performed by: INTERNAL MEDICINE

## 2022-09-08 PROCEDURE — C1753 CATH, INTRAVAS ULTRASOUND: HCPCS | Performed by: INTERNAL MEDICINE

## 2022-09-08 PROCEDURE — C1894 INTRO/SHEATH, NON-LASER: HCPCS | Performed by: INTERNAL MEDICINE

## 2022-09-08 PROCEDURE — 99153 MOD SED SAME PHYS/QHP EA: CPT | Performed by: INTERNAL MEDICINE

## 2022-09-08 PROCEDURE — 85027 COMPLETE CBC AUTOMATED: CPT | Performed by: INTERNAL MEDICINE

## 2022-09-08 PROCEDURE — 92978 ENDOLUMINL IVUS OCT C 1ST: CPT | Performed by: INTERNAL MEDICINE

## 2022-09-08 PROCEDURE — C1769 GUIDE WIRE: HCPCS | Performed by: INTERNAL MEDICINE

## 2022-09-08 PROCEDURE — 80048 BASIC METABOLIC PNL TOTAL CA: CPT | Performed by: INTERNAL MEDICINE

## 2022-09-08 PROCEDURE — 85610 PROTHROMBIN TIME: CPT | Performed by: INTERNAL MEDICINE

## 2022-09-08 PROCEDURE — C1725 CATH, TRANSLUMIN NON-LASER: HCPCS | Performed by: INTERNAL MEDICINE

## 2022-09-08 RX ORDER — LIDOCAINE HYDROCHLORIDE 20 MG/ML
INJECTION, SOLUTION INFILTRATION; PERINEURAL AS NEEDED
Status: DISCONTINUED | OUTPATIENT
Start: 2022-09-08 | End: 2022-09-08 | Stop reason: HOSPADM

## 2022-09-08 RX ORDER — SODIUM CHLORIDE 0.9 % (FLUSH) 0.9 %
10 SYRINGE (ML) INJECTION AS NEEDED
Status: DISCONTINUED | OUTPATIENT
Start: 2022-09-08 | End: 2022-09-08 | Stop reason: HOSPADM

## 2022-09-08 RX ORDER — SODIUM CHLORIDE 9 MG/ML
50 INJECTION, SOLUTION INTRAVENOUS ONCE
Status: COMPLETED | OUTPATIENT
Start: 2022-09-08 | End: 2022-09-08

## 2022-09-08 RX ORDER — FENTANYL CITRATE 50 UG/ML
INJECTION, SOLUTION INTRAMUSCULAR; INTRAVENOUS AS NEEDED
Status: DISCONTINUED | OUTPATIENT
Start: 2022-09-08 | End: 2022-09-08 | Stop reason: HOSPADM

## 2022-09-08 RX ORDER — SODIUM CHLORIDE 0.9 % (FLUSH) 0.9 %
3 SYRINGE (ML) INJECTION EVERY 12 HOURS SCHEDULED
Status: DISCONTINUED | OUTPATIENT
Start: 2022-09-08 | End: 2022-09-08 | Stop reason: HOSPADM

## 2022-09-08 RX ORDER — ASPIRIN 81 MG/1
81 TABLET ORAL DAILY
Status: DISCONTINUED | OUTPATIENT
Start: 2022-09-09 | End: 2022-09-08 | Stop reason: HOSPADM

## 2022-09-08 RX ORDER — ASPIRIN 81 MG/1
81 TABLET ORAL DAILY
Qty: 90 TABLET | Refills: 1 | Status: SHIPPED | OUTPATIENT
Start: 2022-09-08

## 2022-09-08 RX ORDER — ATORVASTATIN CALCIUM 40 MG/1
40 TABLET, FILM COATED ORAL DAILY
Qty: 90 TABLET | Refills: 1 | Status: SHIPPED | OUTPATIENT
Start: 2022-09-08

## 2022-09-08 RX ORDER — MIDAZOLAM HYDROCHLORIDE 1 MG/ML
INJECTION INTRAMUSCULAR; INTRAVENOUS AS NEEDED
Status: DISCONTINUED | OUTPATIENT
Start: 2022-09-08 | End: 2022-09-08 | Stop reason: HOSPADM

## 2022-09-08 RX ORDER — ASPIRIN 81 MG/1
243 TABLET, CHEWABLE ORAL DAILY
Status: DISCONTINUED | OUTPATIENT
Start: 2022-09-08 | End: 2022-09-08 | Stop reason: HOSPADM

## 2022-09-08 RX ORDER — ASPIRIN 325 MG
325 TABLET ORAL ONCE
Status: DISCONTINUED | OUTPATIENT
Start: 2022-09-08 | End: 2022-09-08 | Stop reason: HOSPADM

## 2022-09-08 RX ORDER — ACETAMINOPHEN 325 MG/1
650 TABLET ORAL EVERY 4 HOURS PRN
Status: DISCONTINUED | OUTPATIENT
Start: 2022-09-08 | End: 2022-09-08 | Stop reason: HOSPADM

## 2022-09-08 RX ORDER — SODIUM CHLORIDE 9 MG/ML
75 INJECTION, SOLUTION INTRAVENOUS CONTINUOUS
Status: ACTIVE | OUTPATIENT
Start: 2022-09-08 | End: 2022-09-08

## 2022-09-08 RX ORDER — HEPARIN SODIUM 1000 [USP'U]/ML
INJECTION, SOLUTION INTRAVENOUS; SUBCUTANEOUS AS NEEDED
Status: DISCONTINUED | OUTPATIENT
Start: 2022-09-08 | End: 2022-09-08 | Stop reason: HOSPADM

## 2022-09-08 RX ADMIN — ASPIRIN 81 MG: 81 TABLET, FILM COATED ORAL at 07:07

## 2022-09-08 RX ADMIN — SODIUM CHLORIDE 50 ML/HR: 9 INJECTION, SOLUTION INTRAVENOUS at 07:06

## 2022-09-08 RX ADMIN — SODIUM CHLORIDE 75 ML/HR: 9 INJECTION, SOLUTION INTRAVENOUS at 10:23

## 2022-09-08 RX ADMIN — ASPIRIN 243 MG: 81 TABLET, CHEWABLE ORAL at 08:06

## 2022-09-08 NOTE — POST-PROCEDURE NOTE
Pre-Op Diagnosis:  Nonsustained ventricular tachycardia, frequent PVCs, dyspnea on exertion, moderate aortic valve stenosis  Post-Op Diagnosis:  Severe coronary artery disease  Procedure:  Selective left and right coronary angiography, selective right iliofemoral angiography, IVUS evaluation of the LAD, attempted PCI to the LAD  Anesthesia: Local  EBL: Minimal   Specimens:  None  Findings:  Severe coronary artery disease  Complications: None  Disposition:  Patient became confused and could not follow commands in the middle of the PCI procedure.  Procedure had to be aborted because of this reason.  Will discharge later today and arrange outpatient follow-up in 2 weeks if her mental status improves; consider inpatient admission for further evaluation if her mental status does not improve in the next few hours.    This document has been electronically signed by Roque Carrillo MD on September 8, 2022 10:37 CDT

## 2022-09-08 NOTE — INTERVAL H&P NOTE
H&P reviewed. The patient was examined and there are no changes to the H&P.      [facility] Pre-Op:    Invasive coronary angiography was recommended to the patient.  The patientreports bleeding issues (had nosebleeds on aspirin). The patient denies  use of antiplatelet agents.  The patient denies  CKD. The patient denies  contrast allergy. The patient denies  use of diabetes medications.    Pre-Cath Surgical History: No prior history of CABG or PCI    The risks (bleeding, infection, heart attack, stroke, kidney failure, need for urgent open heart bypass surgery, death), benefits (definitive diagnosis and possible treatment) and alternatives (medical therapy) of this procedure were discussed with the patient in detail today and she agreed to proceed after understanding these.    The indications, risks/benefits and alternatives of diagnostic left heart cardiac catheterization, angiography, conscious sedation, and possible blood transfusion were discussed in detail with the patient. The potential complications of 1/2000 chance of death, 1/1000 chance of heart attack or stroke, 1/500 chance of bleeding or clotting of the femoral artery, and 1/500 chance of allergic reaction to contrast were discussed. We also reviewed possible complications of infection and kidney dysfunction. If PCI were performed and intra-coronary stents indicated, we discussed the details about CONRADO. This included a review of the risks of the infrequent, but relatively higher incidence of late thrombosis with CONRADO. The importance of maintaining a consistent daily regimen of aspirin and an additional anti-platelet agent for as long as directed after implantation was emphasized. No contraindications were found. The patient  appeared to understand and agreed to the above.  -Right heart catheterization, Left heart catheterization, Coronary angiography, In-situ LIMA angiography, Left ventriculography, Intravascular ultrasound, Optical coherence tomography, Flow  wire, Balloon Angioplasty, Iliofemoral angiography, Device closure, femoral artery, Intra-aortic balloon pump, Impella LV assist device implantation, Transvenous pacemaker, and Pericardiocentesis    ASA Class: 3  Mallampati Score: 2    Contraindications to DAPT: No definite contraindications; will evaluate for PCI based on coronary anatomy

## 2022-09-13 ENCOUNTER — DOCUMENTATION (OUTPATIENT)
Dept: CARDIAC REHAB | Facility: HOSPITAL | Age: 67
End: 2022-09-13

## 2022-09-22 ENCOUNTER — OFFICE VISIT (OUTPATIENT)
Dept: FAMILY MEDICINE CLINIC | Facility: CLINIC | Age: 67
End: 2022-09-22

## 2022-09-22 VITALS
SYSTOLIC BLOOD PRESSURE: 140 MMHG | OXYGEN SATURATION: 99 % | HEIGHT: 63 IN | HEART RATE: 84 BPM | WEIGHT: 116 LBS | TEMPERATURE: 97.1 F | BODY MASS INDEX: 20.55 KG/M2 | DIASTOLIC BLOOD PRESSURE: 68 MMHG

## 2022-09-22 DIAGNOSIS — S81.801A NON-HEALING WOUND OF RIGHT LOWER EXTREMITY: Primary | ICD-10-CM

## 2022-09-22 DIAGNOSIS — M79.604 PAIN OF RIGHT LOWER EXTREMITY: ICD-10-CM

## 2022-09-22 PROCEDURE — 99213 OFFICE O/P EST LOW 20 MIN: CPT | Performed by: NURSE PRACTITIONER

## 2022-09-22 RX ORDER — TRAMADOL HYDROCHLORIDE 50 MG/1
50 TABLET ORAL EVERY 6 HOURS PRN
Qty: 40 TABLET | Refills: 0 | Status: SHIPPED | OUTPATIENT
Start: 2022-09-22

## 2022-09-22 NOTE — PROGRESS NOTES
"Chief Complaint  Skin Lesion    Subjective    History of Present Illness {CC  Problem List  Visit  Diagnosis   Encounters  Notes  Medications  Labs  Result Review Imaging  Media :23}     Umer Frazier presents to [facility] for   History of Present Illness  [facility] ER f/u for \"generalized edema\" - presented to ED on 9/14/22. Review of ER notes indicates R venous duplex US performed d/t c/o pain in right leg - result \"no evidence of DVT\" - notable labs: potassium 3.3, calcium 7.6, H/H 8.9/29.2; wound cx of open wound to R ankle +staph and enterobacter cloacae - started on Bactrim DS and advised to f/u with PCP. Presents today with her nephew indicating that wound on R lower lateral leg is \"very painful\" and she is unable to rest d/t pain - wound is covered with dressing.        Objective     Physical Exam  Vitals and nursing note reviewed.   Constitutional:       General: She is not in acute distress.     Appearance: Normal appearance. She is normal weight. She is not ill-appearing.      Comments: Ambulating via WC; nephew Juan Franklin present at visit.   HENT:      Head: Normocephalic and atraumatic.   Eyes:      Conjunctiva/sclera: Conjunctivae normal.      Pupils: Pupils are equal, round, and reactive to light.   Cardiovascular:      Rate and Rhythm: Normal rate and regular rhythm.      Heart sounds: Normal heart sounds.   Pulmonary:      Effort: Pulmonary effort is normal.      Breath sounds: Normal breath sounds. No wheezing or rhonchi.   Musculoskeletal:         General: Swelling (mild ankle bilat) and tenderness present. Normal range of motion.      Cervical back: Normal range of motion and neck supple.   Lymphadenopathy:      Cervical: No cervical adenopathy.   Skin:     General: Skin is warm and dry.      Findings: Erythema and lesion present.             Comments: Large ulcerated wound present in varying stages of healing - appears to " "possible arterial ulcer   Neurological:      General: No focal deficit present.      Mental Status: She is alert and oriented to person, place, and time.      Motor: Weakness present.      Gait: Gait abnormal.   Psychiatric:         Mood and Affect: Mood normal.         Behavior: Behavior normal.        Result Review  Data Reviewed:{ Labs  Result Review  Imaging  Med Tab  Media :23}   The following data was reviewed by (Optional):[id number]}  Common labs    Common Labs 7/1/22 9/8/22 9/8/22     0703 0703   Glucose 108 (A)  86   BUN 7 (A)  10   Creatinine 0.63  0.58   Sodium 140  138   Potassium 3.6  3.5   Chloride 107  106   Calcium 8.0 (A)  8.4 (A)   WBC  4.84    Hemoglobin  9.5 (A)    Hematocrit  30.6 (A)    Platelets  184    (A) Abnormal value            No Images in the past 120 days found..       Vital Signs:   /68 (BP Location: Left arm, Patient Position: Sitting, Cuff Size: Adult)   Pulse 84   Temp 97.1 °F (36.2 °C) (Temporal)   Ht 160 cm (63\")   Wt 52.6 kg (116 lb)   SpO2 99%   BMI 20.55 kg/m²          Assessment and Plan {CC Problem List  Visit Diagnosis  ROS  Review (Popup)  Health Maintenance  Quality  BestPractice  Medications  SmartSets  SnapShot Encounters  Media :23}   Problem List Items Addressed This Visit    None     Visit Diagnoses     Non-healing wound of right lower extremity    -  Primary    Relevant Orders    Ambulatory Referral to Wound Clinic (Completed)    Pain of right lower extremity        Relevant Medications    traMADol (ULTRAM) 50 MG tablet         Diagnosis Plan   1. Non-healing wound of right lower extremity  Ambulatory Referral to Wound Clinic   2. Pain of right lower extremity  traMADol (ULTRAM) 50 MG tablet     - Non-healing wound to R LE - finish Bactrim prescribed by [facility] ER  - Contacted [facility] wound center - advised to clean wound daily with soap and water; apply nonstick dressing secured with Ace wrap - change daily.  - Suspect arterial ulcer d/t severe " pain and venous US normal  - Referral submitted to [facility]  - RX for tramadol submitted - take PRN pain - ALEX reviewed and appropriate.  - RTC 1 mos for f/u - advised to return to ER if pain worsens    Follow Up {Instructions Charge Capture  Follow-up Communications :23}   Return in about 1 month (around 10/22/2022), or if symptoms worsen or fail to improve, for Recheck.  Patient was given instructions and counseling regarding her condition or for health maintenance advice. Please see specific information pulled into the AVS if appropriate            .  This document has been electronically signed by MARLENA Danielle on September 28, 2022 23:12 CDT

## 2022-09-24 LAB
QT INTERVAL: 388 MS
QTC INTERVAL: 469 MS

## 2022-09-26 ENCOUNTER — OFFICE VISIT (OUTPATIENT)
Dept: CARDIOLOGY | Facility: CLINIC | Age: 67
End: 2022-09-26

## 2022-09-26 ENCOUNTER — TELEPHONE (OUTPATIENT)
Dept: CARDIOLOGY | Facility: CLINIC | Age: 67
End: 2022-09-26

## 2022-09-26 VITALS
BODY MASS INDEX: 20.55 KG/M2 | DIASTOLIC BLOOD PRESSURE: 74 MMHG | WEIGHT: 116 LBS | SYSTOLIC BLOOD PRESSURE: 136 MMHG | OXYGEN SATURATION: 98 % | HEIGHT: 63 IN | HEART RATE: 80 BPM

## 2022-09-26 DIAGNOSIS — I35.0 NONRHEUMATIC AORTIC VALVE STENOSIS: ICD-10-CM

## 2022-09-26 DIAGNOSIS — I34.0 NONRHEUMATIC MITRAL VALVE REGURGITATION: ICD-10-CM

## 2022-09-26 DIAGNOSIS — I49.9 IRREGULAR HEART BEAT: Primary | ICD-10-CM

## 2022-09-26 DIAGNOSIS — I25.118 CORONARY ARTERY DISEASE INVOLVING NATIVE CORONARY ARTERY OF NATIVE HEART WITH OTHER FORM OF ANGINA PECTORIS: ICD-10-CM

## 2022-09-26 DIAGNOSIS — I47.29 NSVT (NONSUSTAINED VENTRICULAR TACHYCARDIA): ICD-10-CM

## 2022-09-26 DIAGNOSIS — I49.1 PAC (PREMATURE ATRIAL CONTRACTION): ICD-10-CM

## 2022-09-26 DIAGNOSIS — I49.3 PVC (PREMATURE VENTRICULAR CONTRACTION): ICD-10-CM

## 2022-09-26 PROCEDURE — 99214 OFFICE O/P EST MOD 30 MIN: CPT | Performed by: INTERNAL MEDICINE

## 2022-09-26 NOTE — TELEPHONE ENCOUNTER
Patient is scheduled for procedure on 10/12/2022    You will [place] at [facility] the day of the procedure. The address is [address] [place] [id number]    [facility] will call you the day before and let you know what time you will need to arrive.     Be sure to shower the night before or the morning of.  Avoid using any lotions or perfumes.    Nothing to eat or drink 4 Hours prior to arrival time given to [facility].     You may take your mediations with a small sips of water unless specified below.    Specific Medications:     If you are not feeling well or have a fever the day before your procedure, please call your doctor's office, as we may have to reschedule your procedure.     Be sure to bring a  incase you are not admitted to the hospital. You will not be able to drive the day of your procedure.    Patient voiced their understandings.

## 2022-09-26 NOTE — PROGRESS NOTES
[facility]  OFFICE NOTE    Cardiovascular Medicine  Roque Carrillo M.D., [facility]         No referring provider defined for this encounter.    History of Present Illness    Umer Frazier is a 67 y.o. female who presents for consultation today.  She has hypothyroidism, anemia and rheumatoid arthritis per prior documentation.    The patient was referred to our office for further evaluation of irregular heartbeats.  She is currently living with her nephew; [place] in [place] because she was not able to live independently.  It seems that she was having multiple falls.  Upon my conversation with her today, she reports having multiple falls in her apartment.  She does not recall the exact circumstances of these falls, she would typically wake up on the floor and would not know why she was lying on the floor.  She has not had any such falls or episodes of loss of consciousness while she has been living with her nephew.    She denies having any prior history of coronary stents, heart attacks, open heart surgery, pacemaker/defibrillator placement or heart rhythm problems.  She was told that she had a heart murmur in the past.  Her dad had congestive heart failure.    At present, the patient is able to walk on her own (without assistance with a cane or walker) in her nephew's house.  She denies having any chest discomfort during routine day-to-day activities.  She has had chronic exertional dyspnea, she is not sure if this has gotten worse.  She has not had any PND or orthopnea.  She has had bilateral lower extremity swelling.  She also reports occasional episodes of irregular heartbeats/heart fluttering.  She does not think that she has lost consciousness before; but is not sure.    She had a transthoracic echocardiogram in April 2022 at [facility] in [facility].  According to the report, LVEF was 65 to 70%, mild LVH, moderate left atrial  dilatation, mild to moderate MR, moderate to severe AS, mild TR and mildly elevated RVSP was noted (by report).    9/2/2022:  She presented today for a follow-up visit accompanied by her nephew.  Results of recent transthoracic echocardiogram and heart monitor were extensively reviewed with them.  She continues to have significant exertional dyspnea.  She quit smoking in 1990s.  She has not had any chest discomfort.  Since our last visit, she has not had any palpitations, presyncope or syncope.  She also denies any any PND or orthopnea.  She does report feeling off balance frequently, but has not had any falls since she moved in with her nephew.    9/26/2022:  She presented today for follow-up visit accompanied by her nephew.  She underwent coronary angiography after our last visit and was found to have severe CAD involving the LAD and RCA systems.  PCI to the LAD had to be terminated in the middle of the procedure because of changes in her mental status and inability to lie still on the table.  She has not had any chest discomfort.  She continues to have significant dyspnea on exertion.  Denies any PND, orthopnea or leg swelling.  She has tolerated baby aspirin and Lipitor therapy well.  Denies any significant symptoms of bleeding.    Review of Systems - ROS  Constitution: Negative for weight gain and weight loss.   HENT: Negative for congestion.    Eyes: Negative for blurred vision.   Cardiovascular: As mentioned above  Respiratory: Negative for cough and hemoptysis.    Endocrine: Negative for polydipsia and polyuria.   Hematologic/Lymphatic: Negative for bleeding problem.   Skin: Negative for flushing.   Musculoskeletal: Negative for neck pain and stiffness.   Gastrointestinal: Negative for abdominal pain, nausea and vomiting.   Genitourinary: Negative for dysuria and hematuria.   Neurological: Negative for focal weakness and numbness.   Psychiatric/Behavioral: Negative for altered mental status.      All other  "systems were reviewed and were negative.    Past Medical History:   Diagnosis Date   • Arthritis    • Hypothyroidism    • Murmur, heart        Family History:  Family history is unknown by patient.    Social History: Drinks alcohol occasionally.  She smokes marijuana every once in a while.  She quit smoking almost 30 years ago.   reports that she quit smoking about 29 years ago. She has never used smokeless tobacco. She reports current alcohol use. She reports previous drug use.    Allergies:  Allergies   Allergen Reactions   • Folic Acid Unknown - Low Severity     Other reaction(s): flu symtoms         Current Outpatient Medications:   •  aspirin 81 MG EC tablet, Take 1 tablet by mouth Daily., Disp: 90 tablet, Rfl: 1  •  atorvastatin (LIPITOR) 40 MG tablet, Take 1 tablet by mouth Daily., Disp: 90 tablet, Rfl: 1  •  cetirizine (zyrTEC) 10 MG tablet, Take 10 mg by mouth Daily., Disp: , Rfl:   •  furosemide (LASIX) 40 MG tablet, Take 40 mg by mouth Daily., Disp: , Rfl:   •  levothyroxine (SYNTHROID, LEVOTHROID) 75 MCG tablet, Take 75 mcg by mouth., Disp: , Rfl:   •  methotrexate 2.5 MG tablet, methotrexate sodium 2.5 mg tablet, Disp: , Rfl:   •  Multiple Vitamins-Minerals (MACULAR HEALTH FORMULA PO), Take  by mouth., Disp: , Rfl:   •  multivitamin with minerals tablet tablet, Take 1 tablet by mouth Daily., Disp: , Rfl:   •  traMADol (ULTRAM) 50 MG tablet, Take 1 tablet by mouth Every 6 (Six) Hours As Needed for Moderate Pain or Severe Pain., Disp: 40 tablet, Rfl: 0    Physical Exam:  Vitals:    09/26/22 1104   BP: 136/74   BP Location: Left arm   Patient Position: Sitting   Cuff Size: Adult   Pulse: 80   SpO2: 98%   Weight: 52.6 kg (116 lb)   Height: 160 cm (63\")   PainSc: 0-No pain     Current Pain Level: none  Pulse Ox: Normal on room air, seated in wheelchair  General: alert, appears stated age and cooperative     Body Habitus: well-nourished    HEENT: Head: Normocephalic, no lesions, without obvious abnormality.   "   Neuro: alert, oriented x3  JVP: Volume/Pulsation: Normal.  Normal waveforms.   Appropriate inspiratory decrease.    Carotid Exam: no bruit normal pulsation bilaterally    Respirations: no increased work of breathing   Chest:  Normal    Pulmonary:No crackles or wheezes   Heart rate: normal    Heart Rhythm: regular with periods of irregularity     Heart Sounds: S1: normal  S2: Soft  Systolic murmur heard throughout the precordium; radiation to carotids noted   Abdomen:   Appearance: normal .  Palpation: Soft, bowel sounds positive in all four quadrants  Extremity: No significant pitting edema over the legs.  Deformities involving bilateral fingers and toes of bilateral lower extremities noted      DATA REVIEWED:     EKG. I personally reviewed and interpreted the EKG.  normal sinus rhythm, prolonged QT, nonspecific ST abnormality, PACs, PVCs on 5/17/2022    ECG/EMG Results (all)     None        ---------------------------------------------------  TTE/RAMU:  Results for orders placed in visit on 08/26/22    Adult Transthoracic Echo Complete W/ Cont if Necessary Per Protocol    Interpretation Summary  · Calculated left ventricular 3D EF = 55% Estimated left ventricular EF = 55% Left ventricular ejection fraction appears to be 51 - 55%. Left ventricular systolic function is normal.  · Left ventricular wall thickness is consistent with mild concentric hypertrophy.  · Left atrial volume is mildly increased.  · Moderate aortic valve stenosis is present. Vmax 3.2 m/s, mean gradient 21 mm Hg.  · Mild to moderate mitral valve regurgitation is present.      -----------------------------------------------------  CXR/Imaging:   Imaging Results (Most Recent)     None          -----------------------------------------------------  CT:   No radiology results for the last 30  days.    ----------------------------------------------------      --------------------------------------------------------------------------------------------------  LABS:     The CVD Risk score (Sary, et al., 2008) failed to calculate for the following reasons:    Cannot find a previous HDL lab    Cannot find a previous total cholesterol lab         Lab Results   Component Value Date    GLUCOSE 86 09/08/2022    BUN 10 09/08/2022    CREATININE 0.58 09/08/2022    BCR 17.2 09/08/2022    K 3.5 09/08/2022    CO2 24.0 09/08/2022    CALCIUM 8.4 (L) 09/08/2022    AST 42 (H) 06/11/2018    ALT 30 06/11/2018     Lab Results   Component Value Date    WBC 4.84 09/08/2022    HGB 9.5 (L) 09/08/2022    HCT 30.6 (L) 09/08/2022    MCV 73.0 (L) 09/08/2022     09/08/2022     No results found for: CHOL, CHLPL, TRIG, HDL, LDL, LDLDIRECT  Lab Results   Component Value Date    TSH 0.505 07/01/2022     No results found for: CKTOTAL, CKMB, CKMBINDEX, TROPONINI, TROPONINT  No results found for: HGBA1C  No results found for: DDIMER  Lab Results   Component Value Date    ALT 30 06/11/2018     No results found for: HGBA1C  Lab Results   Component Value Date    CREATININE 0.58 09/08/2022     No results found for: IRON, TIBC, FERRITIN  Lab Results   Component Value Date    INR 1.32 (H) 09/08/2022    PROTIME 16.3 (H) 09/08/2022       [unfilled]    Diagnoses and all orders for this visit:    1. Irregular heart beat (Primary)    2. Coronary artery disease involving native coronary artery of native heart with other form of angina pectoris (HCC)  -     Case Request Cath Lab: Stent CONRADO coronary: LAD with/without RCA    3. PVC (premature ventricular contraction)  -     Case Request Cath Lab: Stent CONRADO coronary: LAD with/without RCA    4. PAC (premature atrial contraction)    5. Nonrheumatic aortic valve stenosis    6. Nonrheumatic mitral valve regurgitation    7. NSVT (nonsustained ventricular tachycardia)  -     Case Request Cath  Lab: Stent CONRADO coronary: LAD with/without RCA       The patient is a 67-year-old female with hypothyroidism, anemia and rheumatoid arthritis who was initially evaluated in our office in July 2022 for evaluation of irregular heartbeats.  The patient reported chronic exertional dyspnea, irregular heartbeat/heart flutters and bilateral lower extremity edema.  She also reported having multiple falls over the past few months (thought to be related to weakness or deformities from arthritis); these have fortunately not recurred since she has moved in with her nephew a few months ago.  Clinic ECG in May 2022 showed sinus rhythm with PACs and PVCs.  QT appeared prolonged.  We proceeded with a 12-day MCT study in July 2022.  Frequent PACs (14% burden), frequent PVCs (5% burden) and one episode of nonsustained ventricular tachycardia (4 beats long) were noted.  Serum TSH was normal in July 2022.  Transthoracic echocardiogram in August 2022 showed normal LV systolic function, mild concentric LVH, moderate aortic valve stenosis, mild to moderate mitral valve regurgitation and possible mild mitral stenosis.  Given the presence of frequent PVCs, runs of nonsustained VT, underlying valvular heart disease and dyspnea on exertion, we proceeded with coronary angiography in September 2022.  This showed severe two-vessel coronary artery disease involving mid LAD and distal RCA. PCI to mid LAD had to be aborted during the procedure due to patient confusion and inability to lie still on the table. No balloon inflations were performed.  Her aortic stenosis appears to be moderate in severity based on latest transthoracic echocardiogram.  She appeared euvolemic on exam today.  Risks (heart attack, stroke, kidney failure, coronary perforation, development of fluid around the heart, need for urgent open heart surgery, death) of proceeding with PCI with/without atherectomy (lesions appear significantly calcified) were reviewed with her and her  nephew in detail today. They agreed to proceed with the procedure after understanding these risks.  We will get the procedure scheduled for her.  Continue baby aspirin and Lipitor therapy.  She denies having any significant bleeding events on aspirin therapy.    Prevention:  BMI is within normal parameters. No other follow-up for BMI required.      Umer Frazier  reports that she quit smoking about 29 years ago. She has never used smokeless tobacco.. I have educated her on continued tobacco cessation.      Return in about 2 months (around 11/26/2022).            Electronically signed by Roque Carrillo MD on 09/26/22 at 10:09 CDT

## 2022-09-28 PROBLEM — D64.9 ANEMIA: Status: ACTIVE | Noted: 2022-09-28

## 2022-09-28 PROBLEM — I10 HYPERTENSION: Status: ACTIVE | Noted: 2022-09-28

## 2022-10-05 ENCOUNTER — PREP FOR SURGERY (OUTPATIENT)
Dept: OTHER | Facility: HOSPITAL | Age: 67
End: 2022-10-05

## 2022-10-05 DIAGNOSIS — I49.3 PVC (PREMATURE VENTRICULAR CONTRACTION): Primary | ICD-10-CM

## 2022-10-05 DIAGNOSIS — I25.118 CORONARY ARTERY DISEASE INVOLVING NATIVE CORONARY ARTERY OF NATIVE HEART WITH OTHER FORM OF ANGINA PECTORIS: ICD-10-CM

## 2022-10-05 DIAGNOSIS — I47.29 NSVT (NONSUSTAINED VENTRICULAR TACHYCARDIA): ICD-10-CM

## 2022-10-05 RX ORDER — SODIUM CHLORIDE 9 MG/ML
50 INJECTION, SOLUTION INTRAVENOUS ONCE
Status: CANCELLED | OUTPATIENT
Start: 2022-10-12 | End: 2022-10-05

## 2022-10-05 RX ORDER — ASPIRIN 325 MG
325 TABLET ORAL ONCE
Status: CANCELLED | OUTPATIENT
Start: 2022-10-12 | End: 2022-10-05

## 2022-10-05 RX ORDER — SODIUM CHLORIDE 0.9 % (FLUSH) 0.9 %
10 SYRINGE (ML) INJECTION AS NEEDED
Status: CANCELLED | OUTPATIENT
Start: 2022-10-12

## 2022-10-05 RX ORDER — ASPIRIN 81 MG/1
81 TABLET ORAL DAILY
Status: CANCELLED | OUTPATIENT
Start: 2022-10-12

## 2022-10-05 RX ORDER — SODIUM CHLORIDE 0.9 % (FLUSH) 0.9 %
3 SYRINGE (ML) INJECTION EVERY 12 HOURS SCHEDULED
Status: CANCELLED | OUTPATIENT
Start: 2022-10-12

## 2022-10-11 ENCOUNTER — ANESTHESIA EVENT (OUTPATIENT)
Dept: CARDIOLOGY | Facility: HOSPITAL | Age: 67
End: 2022-10-11

## 2022-10-12 ENCOUNTER — ANESTHESIA (OUTPATIENT)
Dept: CARDIOLOGY | Facility: HOSPITAL | Age: 67
End: 2022-10-12

## 2022-10-12 ENCOUNTER — HOSPITAL ENCOUNTER (OUTPATIENT)
Facility: HOSPITAL | Age: 67
Discharge: HOME | End: 2022-10-14
Attending: INTERNAL MEDICINE | Admitting: INTERNAL MEDICINE

## 2022-10-12 DIAGNOSIS — I49.3 PVC (PREMATURE VENTRICULAR CONTRACTION): ICD-10-CM

## 2022-10-12 DIAGNOSIS — I47.29 NSVT (NONSUSTAINED VENTRICULAR TACHYCARDIA): ICD-10-CM

## 2022-10-12 DIAGNOSIS — Z74.09 IMPAIRED MOBILITY AND ADLS: ICD-10-CM

## 2022-10-12 DIAGNOSIS — Z74.09 IMPAIRED FUNCTIONAL MOBILITY, BALANCE, GAIT, AND ENDURANCE: ICD-10-CM

## 2022-10-12 DIAGNOSIS — I25.118 CORONARY ARTERY DISEASE INVOLVING NATIVE CORONARY ARTERY OF NATIVE HEART WITH OTHER FORM OF ANGINA PECTORIS: ICD-10-CM

## 2022-10-12 DIAGNOSIS — Z78.9 IMPAIRED MOBILITY AND ADLS: ICD-10-CM

## 2022-10-12 DIAGNOSIS — D50.9 IRON DEFICIENCY ANEMIA, UNSPECIFIED IRON DEFICIENCY ANEMIA TYPE: Primary | ICD-10-CM

## 2022-10-12 DIAGNOSIS — R13.10 DYSPHAGIA, UNSPECIFIED TYPE: ICD-10-CM

## 2022-10-12 LAB
ANION GAP SERPL CALCULATED.3IONS-SCNC: 8 MMOL/L (ref 5–15)
BUN SERPL-MCNC: 9 MG/DL (ref 8–23)
BUN/CREAT SERPL: 12.7 (ref 7–25)
CALCIUM SPEC-SCNC: 8.5 MG/DL (ref 8.6–10.5)
CHLORIDE SERPL-SCNC: 104 MMOL/L (ref 98–107)
CO2 SERPL-SCNC: 26 MMOL/L (ref 22–29)
CREAT SERPL-MCNC: 0.71 MG/DL (ref 0.57–1)
DEPRECATED RDW RBC AUTO: 55.8 FL (ref 37–54)
EGFRCR SERPLBLD CKD-EPI 2021: 92.2 ML/MIN/1.73
ERYTHROCYTE [DISTWIDTH] IN BLOOD BY AUTOMATED COUNT: 22.7 % (ref 12.3–15.4)
GLUCOSE SERPL-MCNC: 82 MG/DL (ref 65–99)
HCT VFR BLD AUTO: 30.1 % (ref 34–46.6)
HGB BLD-MCNC: 9.1 G/DL (ref 12–15.9)
INR PPP: 1.35 (ref 0.8–1.2)
MCH RBC QN AUTO: 21.7 PG (ref 26.6–33)
MCHC RBC AUTO-ENTMCNC: 30.2 G/DL (ref 31.5–35.7)
MCV RBC AUTO: 71.8 FL (ref 79–97)
PLATELET # BLD AUTO: 140 10*3/MM3 (ref 140–450)
PMV BLD AUTO: ABNORMAL FL
POTASSIUM SERPL-SCNC: 3.2 MMOL/L (ref 3.5–5.2)
PROTHROMBIN TIME: 16.6 SECONDS (ref 11.1–15.3)
RBC # BLD AUTO: 4.19 10*6/MM3 (ref 3.77–5.28)
SODIUM SERPL-SCNC: 138 MMOL/L (ref 136–145)
WBC NRBC COR # BLD: 5.4 10*3/MM3 (ref 3.4–10.8)

## 2022-10-12 PROCEDURE — 80048 BASIC METABOLIC PNL TOTAL CA: CPT | Performed by: INTERNAL MEDICINE

## 2022-10-12 PROCEDURE — 25010000002 MIDAZOLAM PER 1 MG: Performed by: NURSE ANESTHETIST, CERTIFIED REGISTERED

## 2022-10-12 PROCEDURE — 25010000002 HEPARIN (PORCINE) PER 1000 UNITS: Performed by: FAMILY MEDICINE

## 2022-10-12 PROCEDURE — 97166 OT EVAL MOD COMPLEX 45 MIN: CPT

## 2022-10-12 PROCEDURE — 63710000001 ATORVASTATIN 40 MG TABLET: Performed by: FAMILY MEDICINE

## 2022-10-12 PROCEDURE — G0378 HOSPITAL OBSERVATION PER HR: HCPCS

## 2022-10-12 PROCEDURE — A9270 NON-COVERED ITEM OR SERVICE: HCPCS | Performed by: FAMILY MEDICINE

## 2022-10-12 PROCEDURE — 85027 COMPLETE CBC AUTOMATED: CPT | Performed by: INTERNAL MEDICINE

## 2022-10-12 PROCEDURE — 99204 OFFICE O/P NEW MOD 45 MIN: CPT | Performed by: INTERNAL MEDICINE

## 2022-10-12 PROCEDURE — 63710000001 CETIRIZINE 10 MG TABLET: Performed by: FAMILY MEDICINE

## 2022-10-12 PROCEDURE — 85610 PROTHROMBIN TIME: CPT | Performed by: INTERNAL MEDICINE

## 2022-10-12 PROCEDURE — 25010000002 FENTANYL CITRATE (PF) 50 MCG/ML SOLUTION: Performed by: NURSE ANESTHETIST, CERTIFIED REGISTERED

## 2022-10-12 PROCEDURE — 92920 PRQ TRLUML C ANGIOP 1ART&/BR: CPT | Performed by: INTERNAL MEDICINE

## 2022-10-12 PROCEDURE — 63710000001 ONE-DAILY MULTI-VIT/MINERAL TABLET: Performed by: FAMILY MEDICINE

## 2022-10-12 PROCEDURE — 0 POTASSIUM CHLORIDE 10 MEQ/100ML SOLUTION: Performed by: INTERNAL MEDICINE

## 2022-10-12 RX ORDER — MIDAZOLAM HYDROCHLORIDE 1 MG/ML
INJECTION INTRAMUSCULAR; INTRAVENOUS AS NEEDED
Status: DISCONTINUED | OUTPATIENT
Start: 2022-10-12 | End: 2022-10-12 | Stop reason: SURG

## 2022-10-12 RX ORDER — ASPIRIN 81 MG/1
81 TABLET ORAL DAILY
Status: DISCONTINUED | OUTPATIENT
Start: 2022-10-13 | End: 2022-10-12 | Stop reason: HOSPADM

## 2022-10-12 RX ORDER — LEVOTHYROXINE SODIUM 0.07 MG/1
75 TABLET ORAL EVERY MORNING
Status: DISCONTINUED | OUTPATIENT
Start: 2022-10-13 | End: 2022-10-14 | Stop reason: HOSPADM

## 2022-10-12 RX ORDER — ASPIRIN 325 MG
325 TABLET ORAL ONCE
Status: DISCONTINUED | OUTPATIENT
Start: 2022-10-12 | End: 2022-10-12 | Stop reason: HOSPADM

## 2022-10-12 RX ORDER — SODIUM CHLORIDE 0.9 % (FLUSH) 0.9 %
10 SYRINGE (ML) INJECTION AS NEEDED
Status: DISCONTINUED | OUTPATIENT
Start: 2022-10-12 | End: 2022-10-14 | Stop reason: HOSPADM

## 2022-10-12 RX ORDER — ATORVASTATIN CALCIUM 40 MG/1
40 TABLET, FILM COATED ORAL NIGHTLY
Status: DISCONTINUED | OUTPATIENT
Start: 2022-10-12 | End: 2022-10-14 | Stop reason: HOSPADM

## 2022-10-12 RX ORDER — ONDANSETRON 2 MG/ML
4 INJECTION INTRAMUSCULAR; INTRAVENOUS EVERY 6 HOURS PRN
Status: DISCONTINUED | OUTPATIENT
Start: 2022-10-12 | End: 2022-10-14 | Stop reason: HOSPADM

## 2022-10-12 RX ORDER — SODIUM CHLORIDE 0.9 % (FLUSH) 0.9 %
10 SYRINGE (ML) INJECTION AS NEEDED
Status: DISCONTINUED | OUTPATIENT
Start: 2022-10-12 | End: 2022-10-12 | Stop reason: HOSPADM

## 2022-10-12 RX ORDER — LANOLIN ALCOHOL/MO/W.PET/CERES
5 CREAM (GRAM) TOPICAL NIGHTLY PRN
Status: DISCONTINUED | OUTPATIENT
Start: 2022-10-12 | End: 2022-10-14 | Stop reason: HOSPADM

## 2022-10-12 RX ORDER — ACETAMINOPHEN 160 MG/5ML
650 SOLUTION ORAL EVERY 4 HOURS PRN
Status: DISCONTINUED | OUTPATIENT
Start: 2022-10-12 | End: 2022-10-12 | Stop reason: SDUPTHER

## 2022-10-12 RX ORDER — SODIUM CHLORIDE 9 MG/ML
50 INJECTION, SOLUTION INTRAVENOUS ONCE
Status: COMPLETED | OUTPATIENT
Start: 2022-10-12 | End: 2022-10-12

## 2022-10-12 RX ORDER — POTASSIUM CHLORIDE 750 MG/1
40 CAPSULE, EXTENDED RELEASE ORAL ONCE
Status: COMPLETED | OUTPATIENT
Start: 2022-10-12 | End: 2022-10-12

## 2022-10-12 RX ORDER — HEPARIN SODIUM 5000 [USP'U]/ML
5000 INJECTION, SOLUTION INTRAVENOUS; SUBCUTANEOUS EVERY 12 HOURS SCHEDULED
Status: DISCONTINUED | OUTPATIENT
Start: 2022-10-12 | End: 2022-10-14 | Stop reason: HOSPADM

## 2022-10-12 RX ORDER — CETIRIZINE HYDROCHLORIDE 10 MG/1
10 TABLET ORAL NIGHTLY
Status: DISCONTINUED | OUTPATIENT
Start: 2022-10-12 | End: 2022-10-14 | Stop reason: HOSPADM

## 2022-10-12 RX ORDER — SODIUM CHLORIDE 9 MG/ML
INJECTION, SOLUTION INTRAVENOUS CONTINUOUS PRN
Status: DISCONTINUED | OUTPATIENT
Start: 2022-10-12 | End: 2022-10-12 | Stop reason: SURG

## 2022-10-12 RX ORDER — ASPIRIN 81 MG/1
81 TABLET ORAL DAILY
Status: DISCONTINUED | OUTPATIENT
Start: 2022-10-13 | End: 2022-10-14 | Stop reason: HOSPADM

## 2022-10-12 RX ORDER — ACETAMINOPHEN 650 MG/1
650 SUPPOSITORY RECTAL EVERY 4 HOURS PRN
Status: DISCONTINUED | OUTPATIENT
Start: 2022-10-12 | End: 2022-10-14 | Stop reason: HOSPADM

## 2022-10-12 RX ORDER — FENTANYL CITRATE 50 UG/ML
INJECTION, SOLUTION INTRAMUSCULAR; INTRAVENOUS AS NEEDED
Status: DISCONTINUED | OUTPATIENT
Start: 2022-10-12 | End: 2022-10-12 | Stop reason: SURG

## 2022-10-12 RX ORDER — SODIUM CHLORIDE 0.9 % (FLUSH) 0.9 %
10 SYRINGE (ML) INJECTION EVERY 12 HOURS SCHEDULED
Status: DISCONTINUED | OUTPATIENT
Start: 2022-10-12 | End: 2022-10-14 | Stop reason: HOSPADM

## 2022-10-12 RX ORDER — ONDANSETRON 4 MG/1
4 TABLET, FILM COATED ORAL EVERY 6 HOURS PRN
Status: DISCONTINUED | OUTPATIENT
Start: 2022-10-12 | End: 2022-10-14 | Stop reason: HOSPADM

## 2022-10-12 RX ORDER — POTASSIUM CHLORIDE 7.45 MG/ML
10 INJECTION INTRAVENOUS
Status: DISCONTINUED | OUTPATIENT
Start: 2022-10-12 | End: 2022-10-12 | Stop reason: HOSPADM

## 2022-10-12 RX ORDER — SODIUM CHLORIDE 0.9 % (FLUSH) 0.9 %
3 SYRINGE (ML) INJECTION EVERY 12 HOURS SCHEDULED
Status: DISCONTINUED | OUTPATIENT
Start: 2022-10-12 | End: 2022-10-12 | Stop reason: HOSPADM

## 2022-10-12 RX ORDER — ONDANSETRON 2 MG/ML
4 INJECTION INTRAMUSCULAR; INTRAVENOUS ONCE AS NEEDED
Status: CANCELLED | OUTPATIENT
Start: 2022-10-12

## 2022-10-12 RX ORDER — TRAMADOL HYDROCHLORIDE 50 MG/1
50 TABLET ORAL EVERY 6 HOURS PRN
Status: DISCONTINUED | OUTPATIENT
Start: 2022-10-12 | End: 2022-10-14 | Stop reason: HOSPADM

## 2022-10-12 RX ORDER — ACETAMINOPHEN 325 MG/1
650 TABLET ORAL EVERY 4 HOURS PRN
Status: DISCONTINUED | OUTPATIENT
Start: 2022-10-12 | End: 2022-10-14 | Stop reason: HOSPADM

## 2022-10-12 RX ORDER — MULTIPLE VITAMINS W/ MINERALS TAB 9MG-400MCG
1 TAB ORAL DAILY
Status: DISCONTINUED | OUTPATIENT
Start: 2022-10-12 | End: 2022-10-14 | Stop reason: HOSPADM

## 2022-10-12 RX ORDER — ALUMINA, MAGNESIA, AND SIMETHICONE 2400; 2400; 240 MG/30ML; MG/30ML; MG/30ML
15 SUSPENSION ORAL EVERY 6 HOURS PRN
Status: DISCONTINUED | OUTPATIENT
Start: 2022-10-12 | End: 2022-10-14 | Stop reason: HOSPADM

## 2022-10-12 RX ADMIN — POTASSIUM CHLORIDE 10 MEQ: 7.46 INJECTION, SOLUTION INTRAVENOUS at 11:09

## 2022-10-12 RX ADMIN — MIDAZOLAM HYDROCHLORIDE 1 MG: 1 INJECTION, SOLUTION INTRAMUSCULAR; INTRAVENOUS at 11:03

## 2022-10-12 RX ADMIN — CETIRIZINE HYDROCHLORIDE 10 MG: 10 TABLET, FILM COATED ORAL at 21:01

## 2022-10-12 RX ADMIN — ATORVASTATIN CALCIUM 40 MG: 40 TABLET, FILM COATED ORAL at 21:01

## 2022-10-12 RX ADMIN — Medication 10 ML: at 21:01

## 2022-10-12 RX ADMIN — SODIUM CHLORIDE: 9 INJECTION, SOLUTION INTRAVENOUS at 11:01

## 2022-10-12 RX ADMIN — HEPARIN SODIUM 5000 UNITS: 5000 INJECTION INTRAVENOUS; SUBCUTANEOUS at 21:01

## 2022-10-12 RX ADMIN — POTASSIUM CHLORIDE 10 MEQ: 750 CAPSULE, EXTENDED RELEASE ORAL at 10:40

## 2022-10-12 RX ADMIN — FENTANYL CITRATE 50 MCG: 50 INJECTION INTRAMUSCULAR; INTRAVENOUS at 11:05

## 2022-10-12 RX ADMIN — SODIUM CHLORIDE 50 ML/HR: 9 INJECTION, SOLUTION INTRAVENOUS at 09:19

## 2022-10-12 RX ADMIN — Medication 1 TABLET: at 17:30

## 2022-10-12 NOTE — THERAPY EVALUATION
Patient Name: Umer Frazier  : 1955    MRN: [id number]                              Today's Date: 10/12/2022       Admit Date: 10/12/2022    Visit Dx:     ICD-10-CM ICD-9-CM   1. PVC (premature ventricular contraction)  I49.3 427.69   2. NSVT (nonsustained ventricular tachycardia) (HCC)  I47.2 427.1   3. Coronary artery disease involving native coronary artery of native heart with other form of angina pectoris (HCC)  I25.118 414.01     413.9   4. Impaired mobility and ADLs  Z74.09 V49.89    Z78.9      Patient Active Problem List   Diagnosis   • Irregular heart beat   • Hypothyroidism   • Rheumatoid arthritis (HCC)   • PVC (premature ventricular contraction)   • Nonrheumatic aortic valve stenosis   • Nonrheumatic mitral valve regurgitation   • Dyspnea on exertion   • NSVT (nonsustained ventricular tachycardia)   • Coronary artery disease involving native coronary artery of native heart with angina pectoris (HCC)   • Hypertension   • Anemia     Past Medical History:   Diagnosis Date   • Arthritis    • Hypothyroidism    • Murmur, heart      Past Surgical History:   Procedure Laterality Date   • CARDIAC CATHETERIZATION N/A 2022    Procedure: Left and Right Heart Cath;  Surgeon: Roque Carrillo MD;  Location: [facility] CATH INVASIVE LOCATION;  Service: Cardiology;  Laterality: N/A;   • CYST REMOVAL     • FEMUR SURGERY        General Information     Row Name 10/12/22 1530          OT Time and Intention    Document Type evaluation  -SA     Mode of Treatment individual therapy;occupational therapy  -SA     Row Name 10/12/22 1530          General Information    Patient Profile Reviewed yes  -SA     Prior Level of Function independent:;feeding;grooming;dressing;bathing;home management;cooking;cleaning  -SA     Existing Precautions/Restrictions fall  -SA     Barriers to Rehab medically complex  -SA     Row Name 10/12/22 1537          Living Environment    People in Home other relative(s)  -SA     Row Name  10/12/22 1530          Home Main Entrance    Number of Stairs, Main Entrance other (see comments)  Ramp  -SA     Stair Railings, Main Entrance none  -     Row Name 10/12/22 1530          Stairs Within Home, Primary    Stairs, Within Home, Primary Has a cane, standard walker, and rolling walker. Shower tub combo with bench/chair. regular toilet.  -     Number of Stairs, Within Home, Primary none  -SA     Stair Railings, Within Home, Primary none  -SA     Row Name 10/12/22 1530          Cognition    Orientation Status (Cognition) oriented x 4  -SA     Row Name 10/12/22 1530          Safety Issues, Functional Mobility    Safety Issues Affecting Function (Mobility) ability to follow commands;at risk behavior observed;awareness of need for assistance;impulsivity;insight into deficits/self-awareness;judgment;problem-solving  -     Impairments Affecting Function (Mobility) balance;coordination;endurance/activity tolerance;grasp;strength  -           User Key  (r) = Recorded By, (t) = Taken By, (c) = Cosigned By    Initials Name Provider Type    SA Salma Rodriguez OT Occupational Therapist                 Mobility/ADL's     Row Name 10/12/22 1530          Bed Mobility    Bed Mobility supine-sit;sit-supine  -     Supine-Sit [place] (Bed Mobility) minimum assist (75% patient effort)  -     Sit-Supine [place] (Bed Mobility) minimum assist (75% patient effort)  -     Assistive Device (Bed Mobility) bed rails;head of bed elevated  -     Row Name 10/12/22 1530          Transfers    Transfers sit-stand transfer;stand-sit transfer;toilet transfer  -     Row Name 10/12/22 1530          Sit-Stand Transfer    Sit-Stand [place] (Transfers) contact guard  -     Row Name 10/12/22 1530          Stand-Sit Transfer    Stand-Sit [place] (Transfers) contact guard  -     Row Name 10/12/22 1530          Toilet Transfer    [place] Level (Toilet Transfer) contact guard  -     Row Name 10/12/22 1530           Activities of Daily Living    BADL Assessment/Intervention lower body dressing;toileting  -     Row Name 10/12/22 1530          Lower Body Dressing Assessment/Training    [place] Level (Lower Body Dressing) doff;don;socks;standby assist  -SA     Position (Lower Body Dressing) edge of bed sitting  -     Row Name 10/12/22 1530          Toileting Assessment/Training    [place] Level (Toileting) adjust/manage clothing;perform perineal hygiene;standby assist  -SA           User Key  (r) = Recorded By, (t) = Taken By, (c) = Cosigned By    Initials Name Provider Type    Salma Weir OT Occupational Therapist               Obj/Interventions     Row Name 10/12/22 1530          Sensory Assessment (Somatosensory)    Sensory Assessment (Somatosensory) UE sensation intact  -[facility] Name 10/12/22 1530          Range of Motion Comprehensive    General Range of Motion other (see comments)  -     Comment, General Range of Motion BUE WFL. Except: decreased ROM in digits due to arthritis. Decreased fine motor coordination  -[facility] Name 10/12/22 1530          Strength Comprehensive (MMT)    Comment, General Manual Muscle Testing (MMT) Assessment B shoulder 4/5. B Elbow 3/5.  3/5  -[facility] Name 10/12/22 1530          Motor Skills    Motor Skills coordination  -     Coordination fine motor deficit;bilateral  -           User Key  (r) = Recorded By, (t) = Taken By, (c) = Cosigned By    Initials Name Provider Type    Salma Weir OT Occupational Therapist               Goals/Plan     [facility] Name 10/12/22 1530          Transfer Goal 1 (OT)    Activity/Assistive Device (Transfer Goal 1, OT) transfers, all  -     [place] Level/Cues Needed (Transfer Goal 1, OT) independent  -SA     Time Frame (Transfer Goal 1, OT) long term goal (LTG);by discharge  -     Progress/Outcome (Transfer Goal 1, OT) goal not met  -[facility] Name 10/12/22 1530          Bathing Goal 1 (OT)    Activity/Device  (Bathing Goal 1, OT) bathing skills, all  -SA     [place] Level/Cues Needed (Bathing Goal 1, OT) independent  -SA     Time Frame (Bathing Goal 1, OT) long term goal (LTG);by discharge  -SA     Progress/Outcomes (Bathing Goal 1, OT) goal not met  -     Row Name 10/12/22 1530          Dressing Goal 1 (OT)    Activity/Device (Dressing Goal 1, OT) dressing skills, all  -SA     [place]/Cues Needed (Dressing Goal 1, OT) independent  -SA     Time Frame (Dressing Goal 1, OT) long term goal (LTG);by discharge  -SA     Progress/Outcome (Dressing Goal 1, OT) goal not met  -     Row Name 10/12/22 1530          Toileting Goal 1 (OT)    Activity/Device (Toileting Goal 1, OT) toileting skills, all  -SA     [place] Level/Cues Needed (Toileting Goal 1, OT) independent  -SA     Time Frame (Toileting Goal 1, OT) long term goal (LTG);by discharge  -SA     Progress/Outcome (Toileting Goal 1, OT) goal not met  -     Row Name 10/12/22 1530          Therapy Assessment/Plan (OT)    Planned Therapy Interventions (OT) activity tolerance training;functional balance retraining;occupation/activity based interventions;ROM/therapeutic exercise;IADL retraining;adaptive equipment training;BADL retraining;manual therapy/joint mobilization;strengthening exercise;transfer/mobility retraining;passive ROM/stretching;neuromuscular control/coordination retraining;cognitive/visual perception retraining;edema control/reduction;patient/caregiver education/training  -           User Key  (r) = Recorded By, (t) = Taken By, (c) = Cosigned By    Initials Name Provider Type    Salma Weir OT Occupational Therapist               Clinical Impression     Row Name 10/12/22 1530          Pain Assessment    Pretreatment Pain Rating 0/10 - no pain  -     Posttreatment Pain Rating 0/10 - no pain  -     Row Name 10/12/22 1530          Plan of Care Review    Plan of Care Reviewed With patient;family  -     Outcome Evaluation OT Branden  completed. Pt agreeable to therapy, but agitated at times when asked to complete ADL tasks. Pt completed sup<>sit with SBA. CGA required for sit<>stand and func mob EOB and toilet. SBA toileting. SBA doff<>don socks EOB. BUE WFL. Except: decreased ROM in digits due to arthritis. Decreased fine motor coordination. Cont skilled IP/OT to address decreased strength, ROM, safety with func mob, and [place] with ADLs, IADLs, and transfers. Recommended d/c home with 24/7 assist.  -     Row Name 10/12/22 1531          Therapy Assessment/Plan (OT)    Patient/Family Therapy Goal Statement (OT) to return home  -     Rehab Potential (OT) good, to achieve stated therapy goals  -     Criteria for Skilled Therapeutic Interventions Met (OT) yes;meets criteria;skilled treatment is necessary  -     Therapy Frequency (OT) other (see comments)  3-7 d/wk  -     Predicted Duration of Therapy Intervention (OT) Until all goals met or d/c from hospital  -     Row Name 10/12/22 1570          Therapy Plan Review/Discharge Plan (OT)    Anticipated Discharge Disposition (OT) home with 24/7 care;home with assist  -     Row Name 10/12/22 4292          Vital Signs    Pre Systolic BP Rehab 176  -SA     Pre Treatment Diastolic BP 80  -SA     Pretreatment Heart Rate (beats/min) 53  -SA     Pre SpO2 (%) 98  -SA     O2 Delivery Pre Treatment room air  -SA     Pre Patient Position Supine  -     Row Name 10/12/22 1642          Positioning and Restraints    Pre-Treatment Position in bed  -SA     Post Treatment Position other  Pt refused to lay down in bed. Pt wanted to sit EOB. Unable to turn on bed alarm due to weight distribution. Pt left sitting EOB with family in room. CNA notified.  -SA     In Bed sitting EOB;call light within reach;encouraged to call for assist;with family/caregiver  -           User Key  (r) = Recorded By, (t) = Taken By, (c) = Cosigned By    Initials Name Provider Type    Salma Weir OT Occupational  Therapist               Outcome Measures     Row Name 10/12/22 1530          How much help from another is currently needed...    Putting on and taking off regular lower body clothing? 3  -SA     Bathing (including washing, rinsing, and drying) 3  -SA     Toileting (which includes using toilet bed pan or urinal) 3  -SA     Putting on and taking off regular upper body clothing 4  -SA     Taking care of personal grooming (such as brushing teeth) 4  -SA     Eating meals 4  -SA     AM-PAC 6 Clicks Score (OT) 21  -     Row Name 10/12/22 1530          Functional Assessment    Outcome Measure Options AM-PAC 6 Clicks Daily Activity (OT)  -           User Key  (r) = Recorded By, (t) = Taken By, (c) = Cosigned By    Initials Name Provider Type    Salma Weir OT Occupational Therapist                Occupational Therapy Education     Title: PT OT SLP Therapies (Done)     Topic: Occupational Therapy (Done)     Point: ADL training (Done)     Description:   Instruct learner(s) on proper safety adaptation and remediation techniques during self care or transfers.   Instruct in proper use of assistive devices.              Learning Progress Summary           Patient Acceptance, E,TB, VU by  at 10/12/2022 1610    Comment: OT POC, Role of OT, transfer training                   Point: Home exercise program (Done)     Description:   Instruct learner(s) on appropriate technique for monitoring, assisting and/or progressing therapeutic exercises/activities.              Learning Progress Summary           Patient Acceptance, E,TB, VU by  at 10/12/2022 1610    Comment: OT POC, Role of OT, transfer training                   Point: Precautions (Done)     Description:   Instruct learner(s) on prescribed precautions during self-care and functional transfers.              Learning Progress Summary           Patient Acceptance, E,TB, VU by  at 10/12/2022 1610    Comment: OT POC, Role of OT, transfer training                    Point: Body mechanics (Done)     Description:   Instruct learner(s) on proper positioning and spine alignment during self-care, functional mobility activities and/or exercises.              Learning Progress Summary           Patient Acceptance, E,TB, VU by  at 10/12/2022 1610    Comment: OT POC, Role of OT, transfer training                               User Key     Initials Effective Dates Name Provider Type Discipline     08/11/22 -  Salma Rodriguez OT Occupational Therapist OT              OT Recommendation and Plan  Planned Therapy Interventions (OT): activity tolerance training, functional balance retraining, occupation/activity based interventions, ROM/therapeutic exercise, IADL retraining, adaptive equipment training, BADL retraining, manual therapy/joint mobilization, strengthening exercise, transfer/mobility retraining, passive ROM/stretching, neuromuscular control/coordination retraining, cognitive/visual perception retraining, edema control/reduction, patient/caregiver education/training  Therapy Frequency (OT): other (see comments) (3-7 d/wk)  Plan of Care Review  Plan of Care Reviewed With: patient, family  Outcome Evaluation: OT Eval completed. Pt agreeable to therapy, but agitated at times when asked to complete ADL tasks. Pt completed sup<>sit with SBA. CGA required for sit<>stand and func mob EOB and toilet. SBA toileting. SBA doff<>don socks EOB. BUE WFL. Except: decreased ROM in digits due to arthritis. Decreased fine motor coordination. Cont skilled IP/OT to address decreased strength, ROM, safety with func mob, and [place] with ADLs, IADLs, and transfers. Recommended d/c home with 24/7 assist.     Time Calculation:    Time Calculation- OT     Row Name 10/12/22 1530             Time Calculation- OT    OT Start Time 1530  -      OT Stop Time 1613  -      OT Time Calculation (min) 43 min  -      OT Received On 10/12/22  -      OT Goal Re-Cert Due Date 10/25/22  -          Untimed Charges    OT Eval/Re-eval Minutes 43  -SA         Total Minutes    Untimed Charges Total Minutes 43  -SA       Total Minutes 43  -SA            User Key  (r) = Recorded By, (t) = Taken By, (c) = Cosigned By    Initials Name Provider Type    Salma Weir OT Occupational Therapist              Therapy Charges for Today     Code Description Service Date Service Provider Modifiers Qty    [id number] HC OT EVAL MOD COMPLEXITY 3 10/12/2022 Salma Rodriguez OT GO 1               Salma Rodriguez OT  10/12/2022

## 2022-10-12 NOTE — INTERVAL H&P NOTE
H&P reviewed. The patient was examined and there are no changes to the H&P.      [facility] Pre-Op:    Invasive coronary angiography was recommended to the patient.  The patientdenies  bleeding issues. The patient reports use of antiplatelet agents.  The patient denies  CKD. The patient denies  contrast allergy. The patient denies  use of diabetes medications.    Pre-Cath Surgical History: No prior history of CABG or PCI    The risks (bleeding, infection, heart attack, coronary perforation, stroke, kidney failure, need for urgent open heart bypass surgery, death), benefits (definitive diagnosis and possible treatment) and alternatives (medical therapy) of this procedure were discussed with the patient in detail today and he agreed to proceed after understanding these.    The indications, risks/benefits and alternatives of diagnostic left heart cardiac catheterization, angiography, conscious sedation, and possible blood transfusion were discussed in detail with the patient. The potential complications of 1/2000 chance of death, 1/1000 chance of heart attack or stroke, 1/500 chance of bleeding or clotting of the femoral artery, and 1/500 chance of allergic reaction to contrast were discussed. We also reviewed possible complications of infection and kidney dysfunction. If PCI were performed and intra-coronary stents indicated, we discussed the details about CONRADO. This included a review of the risks of the infrequent, but relatively higher incidence of late thrombosis with CONRADO. The importance of maintaining a consistent daily regimen of aspirin and an additional anti-platelet agent for as long as directed after implantation was emphasized. No contraindications were found. The patient  appeared to understand and agreed to the above.  -Left heart catheterization, Coronary angiography, In-situ LIMA angiography, Left ventriculography, Intravascular ultrasound, Optical coherence tomography, Flow wire, Balloon Angioplasty, Coronary  stent, Iliofemoral angiography, Device closure, femoral artery, Intra-aortic balloon pump, Impella LV assist device implantation, Transvenous pacemaker, and Pericardiocentesis orbital atherectomy    ASA Class: 3    Contraindications to DAPT: None

## 2022-10-12 NOTE — H&P
[facility] Medicine Admission      Date of Admission: 10/12/2022      Primary Care Physician: Provider, No Known      Chief Complaint: Shortness of breath/dysphagia    HPI: Patient is a 67 year old female with PMH notable for Hypothryoidism, PVC's, CAD, NSVT, and RA who was admitted after aborted heart cath after the patient's heart rate was noted to drop to 40 bpm after versed administration.  Patient had also noted difficulties at that time with dysphagia in regards to swallowing her potassium pills prior to procedure and shortness of breath.  Admission was requested for further evaluation.  Patient states that she has gotten choked on several different consistencies of food in the past and feels like this contributes to her difficulties breathing at times.  She denies any other current concerns or complaints during my evaluation.     Concurrent Medical History:  has a past medical history of Arthritis, Hypothyroidism, and Murmur, heart.    Past Surgical History:  has a past surgical history that includes Femur Surgery; Cyst Removal; and Cardiac catheterization (N/A, 9/8/2022).    Family History: Family history is unknown by patient. No changes    Social History:  reports that she quit smoking about 29 years ago. Her smoking use included cigarettes. She has never used smokeless tobacco. She reports current alcohol use. She reports that she does not use drugs.    Allergies:   Allergies   Allergen Reactions   • Folic Acid Unknown - Low Severity     Other reaction(s): flu symtoms   • Nickel Rash       Medications:   Prior to Admission medications    Medication Sig Start Date End Date Taking? Authorizing Provider   aspirin 81 MG EC tablet Take 1 tablet by mouth Daily. 9/8/22  Yes Roque Carrillo MD   atorvastatin (LIPITOR) 40 MG tablet Take 1 tablet by mouth Daily. 9/8/22  Yes Roque Carrillo MD   cetirizine (zyrTEC) 10 MG tablet Take 10 mg by mouth Daily.   Yes  Provider, MD Aung   levothyroxine (SYNTHROID, LEVOTHROID) 75 MCG tablet Take 1 tablet by mouth Daily. 2/24/22  Yes Adalid Torres MD   Multiple Vitamins-Minerals (MACULAR HEALTH FORMULA PO) Take 1 tablet/day by mouth Daily.   Yes ProviderAung MD   multivitamin with minerals tablet tablet Take 1 tablet by mouth Daily.   Yes Aung Dunbar MD   traMADol (ULTRAM) 50 MG tablet Take 1 tablet by mouth Every 6 (Six) Hours As Needed for Moderate Pain or Severe Pain. 9/22/22  Yes Jolie Maravilla APRN   methotrexate 2.5 MG tablet methotrexate sodium 2.5 mg tablet 2/24/22   Adalid Torres MD   furosemide (LASIX) 40 MG tablet Take 40 mg by mouth Daily. 9/27/21 10/12/22  Adalid Torres MD       Review of Systems:  Review of Systems   Constitutional: Negative for chills and fever.   HENT: Positive for trouble swallowing.    Respiratory: Positive for shortness of breath.    Cardiovascular: Negative for chest pain.   Gastrointestinal: Negative for abdominal pain, constipation, diarrhea, nausea and vomiting.   Genitourinary: Negative.    Musculoskeletal: Negative.    Skin: Negative.    Neurological: Negative.    Psychiatric/Behavioral: Negative.    All other systems reviewed and are negative.     Otherwise complete ROS is negative except as mentioned above.    Physical Exam:   Temp:  [96.7 °F (35.9 °C)] 96.7 °F (35.9 °C)  Heart Rate:  [54] 54  Resp:  [18] 18  BP: (167)/(72) 167/72  Physical Exam  Constitutional:       General: She is not in acute distress.     Appearance: She is not toxic-appearing.   HENT:      Head: Normocephalic and atraumatic.      Right Ear: External ear normal.      Left Ear: External ear normal.      Nose: Nose normal.      Mouth/Throat:      Mouth: Mucous membranes are moist.      Pharynx: Oropharynx is clear.   Eyes:      Conjunctiva/sclera: Conjunctivae normal.   Cardiovascular:      Rate and Rhythm: Normal rate and regular rhythm.      Pulses: Normal pulses.      Heart  sounds: Normal heart sounds.   Pulmonary:      Effort: Pulmonary effort is normal. No respiratory distress.      Breath sounds: Normal breath sounds.   Abdominal:      General: Bowel sounds are normal.      Palpations: Abdomen is soft.      Tenderness: There is no abdominal tenderness.   Musculoskeletal:         General: No deformity.   Skin:     General: Skin is warm and dry.      Capillary Refill: Capillary refill takes less than 2 seconds.   Neurological:      General: No focal deficit present.      Mental Status: She is alert and oriented to person, place, and time. Mental status is at baseline.   Psychiatric:         Behavior: Behavior normal.         Thought Content: Thought content normal.           Results Reviewed:  I have personally reviewed current lab, radiology, and data and agree with results.  Lab Results (last 24 hours)     Procedure Component Value Units Date/Time    Basic Metabolic Panel [[id number]]  (Abnormal) Collected: 10/12/22 0916    Specimen: Blood Updated: 10/12/22 0954     Glucose 82 mg/dL      BUN 9 mg/dL      Creatinine 0.71 mg/dL      Sodium 138 mmol/L      Potassium 3.2 mmol/L      Chloride 104 mmol/L      CO2 26.0 mmol/L      Calcium 8.5 mg/dL      BUN/Creatinine Ratio 12.7     Anion Gap 8.0 mmol/L      eGFR 92.2 mL/min/1.73      Comment: National Kidney Foundation and American Society of Nephrology (ASN) Task Force recommended calculation based on the Chronic Kidney Disease Epidemiology Collaboration (CKD-EPI) equation refit without adjustment for race.       Narrative:      GFR Normal >60  Chronic Kidney Disease <60  Kidney Failure <15      CBC (No Diff) [[id number]]  (Abnormal) Collected: 10/12/22 0916    Specimen: Blood Updated: 10/12/22 0944     WBC 5.40 10*3/mm3      RBC 4.19 10*6/mm3      Hemoglobin 9.1 g/dL      Hematocrit 30.1 %      MCV 71.8 fL      MCH 21.7 pg      MCHC 30.2 g/dL      RDW 22.7 %      RDW-SD 55.8 fl      MPV --     Comment: Unable to calculate           Platelets 140 10*3/mm3     Protime-INR [[id number]]  (Abnormal) Collected: 10/12/22 0916    Specimen: Blood Updated: 10/12/22 0944     Protime 16.6 Seconds      INR 1.35    Narrative:      Therapeutic range for most indications is 2.0-3.0 INR,  or 2.5-3.5 for mechanical heart valves.        Imaging Results (Last 24 Hours)     ** No results found for the last 24 hours. **            Assessment:    Active Hospital Problems    Diagnosis    • Coronary artery disease involving native coronary artery of native heart with angina pectoris (HCC)    • PVC (premature ventricular contraction)      Added automatically from request for surgery [id number]     • NSVT (nonsustained ventricular tachycardia)      Added automatically from request for surgery [id number]               Plan:  -Admit for observation  - GI consultation appreciated  - Continue medications as appropriate  - PT and OT  - SLP  - Cardiology to follow as well, appreciate their  - Monitor on telemetry  - Check chest x-ray during admission and will although saturations are stable on room air  - DVT prophylaxis with heparin  - CODE STATUS: Full    I confirmed that the patient's Advance Care Plan is present, code status is documented, or surrogate decision maker is listed in the patient's medical record.     I have utilized all available immediate resources to obtain, update, or review the patient's current medications.     I discussed the patient's findings and my recommendations with: The patient and nephew at beside.      Jaxon Friedman MD

## 2022-10-12 NOTE — PLAN OF CARE
Goal Outcome Evaluation:  Plan of Care Reviewed With: patient, family           Outcome Evaluation: OT Eval completed. Pt agreeable to therapy, but agitated at times when asked to complete ADL tasks. Pt completed sup<>sit with SBA. CGA required for sit<>stand and func mob EOB and toilet. SBA toileting. SBA doff<>don socks EOB. BUE WFL. Except: decreased ROM in digits due to arthritis. Decreased fine motor coordination. Cont skilled IP/OT to address decreased strength, ROM, safety with func mob, and [place] with ADLs, IADLs, and transfers. Recommended d/c home with 24/7 assist.

## 2022-10-12 NOTE — ANESTHESIA PREPROCEDURE EVALUATION
Anesthesia Evaluation     Patient summary reviewed and Nursing notes reviewed   no history of anesthetic complications:  NPO Solid Status: > 8 hours  NPO Liquid Status: > 2 hours           Airway   Mallampati: II  TM distance: >3 FB  Neck ROM: full  possible difficult intubation  Dental    (+) poor dentation    Comment: Remaining upper and lower dentition in hopeless repair.    Pulmonary     breath sounds clear to auscultation  (+) a smoker Former,   (-) shortness of breath  Cardiovascular     ECG reviewed  Rhythm: regular  Rate: normal    (+) hypertension, valvular problems/murmurs murmur, AS and MR, CAD, dysrhythmias PVC, Tachycardia, angina, LAMBERT, murmur (Grade III/VI systolic),     ROS comment: Sinus rhythm with Premature atrial complexes in a pattern of bigeminy  Nonspecific ST and T wave abnormality  Abnormal ECG  When compared with ECG of 17-MAY-2022 11:18,  Premature ventricular complexes are no longer Present     Referred By:            Confirmed By: ROQUE CARRILLOConclusion :  Severe two vessel CAD involving mid LAD and distal RCA.  PCI to mid LAD had to be aborted during the procedure due to patient confusion and inability to lie still on the table. No balloon inflations were performed.     Plan:   Continue antiplatelet therapy with aspirin. Initiate statin therapy.  Optimize medical therapy for CAD risk factors.  Routine post cath care instructions (no driving for 48 hrs, no lifting >10 lbs for 7 days) were discussed with the patient and family.  Observation in same day care unit. Discharge later today if mental status improves.   Outpatient follow up in 2 weeks to discuss further steps.        This document has been electronically signed by Roque Carrillo MD on September 8, 2022 10:15 CDT      Calculated left ventricular 3D EF = 55% Estimated left ventricular EF = 55% Left ventricular ejection fraction appears to be 51 - 55%. Left ventricular systolic function is normal.   Left ventricular wall  thickness is consistent with mild concentric hypertrophy.   Left atrial volume is mildly increased.   Moderate aortic valve stenosis is present. Vmax 3.2 m/s, mean gradient 21 mm Hg.   Mild to moderate mitral valve regurgitation is present.      Neuro/Psych- negative ROS  GI/Hepatic/Renal/Endo    (+)   thyroid problem hypothyroidism    Musculoskeletal     Abdominal    Substance History - negative use     OB/GYN negative ob/gyn ROS         Other   arthritis (Rheumatoid),      ROS/Med Hx Other: HGB 9.5                  Anesthesia Plan    ASA 4     general and MAC   total IV anesthesia  intravenous induction     Anesthetic plan, risks, benefits, and alternatives have been provided, discussed and informed consent has been obtained with: patient and child.  Pre-procedure education provided  Plan discussed with CRNA.        CODE STATUS:

## 2022-10-12 NOTE — CONSULTS
SUBJECTIVE:   10/12/2022    Name: Umer Frazier  DOD: 1955    REASON FOR CONSULT: Dysphagia    Chief Complaint:   No chief complaint on file.      Subjective     Patient is 67 y.o. female currently being evaluated for cardiac issues.  Patient has had dysphagia for a long period of time and states that this continues up to this point.  Patient has been able to eat prior to this admission and states that yesterday she had a Que Harjinder sausage which did not get caught in esophagus     ROS/HISTORY/ CURRENT MEDICATIONS/OBJECTIVE/VS/PE:   Review of Systems:   Review of Systems   Constitutional: Negative for activity change, appetite change, chills, diaphoresis, fatigue, fever and unexpected weight change.   HENT: Negative for sore throat and trouble swallowing.    Respiratory: Negative for shortness of breath.    Gastrointestinal: Negative for abdominal distention, abdominal pain, anal bleeding, blood in stool, constipation, diarrhea, nausea, rectal pain and vomiting.   Endocrine: Negative for polydipsia, polyphagia and polyuria.   Genitourinary: Negative for difficulty urinating.   Musculoskeletal: Negative for arthralgias.   Skin: Negative for pallor.   Allergic/Immunologic: Negative for food allergies.   Neurological: Negative for weakness and light-headedness.   Psychiatric/Behavioral: Negative for behavioral problems.       History:     Past Medical History:   Diagnosis Date   • Arthritis    • Hypothyroidism    • Murmur, heart      Past Surgical History:   Procedure Laterality Date   • CARDIAC CATHETERIZATION N/A 2022    Procedure: Left and Right Heart Cath;  Surgeon: Roque Carrillo MD;  Location: [facility] INVASIVE LOCATION;  Service: Cardiology;  Laterality: N/A;   • CYST REMOVAL     • FEMUR SURGERY       Family History   Family history unknown: Yes     Social History     Tobacco Use   • Smoking status: Former     Types: Cigarettes     Quit date:      Years since quittin.7   • Smokeless  tobacco: Never   Vaping Use   • Vaping Use: Never used   Substance Use Topics   • Alcohol use: Yes     Comment: ON OCC.   • Drug use: Never     Medications Prior to Admission   Medication Sig Dispense Refill Last Dose   • aspirin 81 MG EC tablet Take 1 tablet by mouth Daily. 90 tablet 1 10/12/2022 at 0530   • atorvastatin (LIPITOR) 40 MG tablet Take 1 tablet by mouth Daily. 90 tablet 1 10/11/2022 at 2100   • cetirizine (zyrTEC) 10 MG tablet Take 10 mg by mouth Daily.   10/11/2022 at 2100   • levothyroxine (SYNTHROID, LEVOTHROID) 75 MCG tablet Take 1 tablet by mouth Daily.   10/12/2022 at 0530   • Multiple Vitamins-Minerals (MACULAR HEALTH FORMULA PO) Take 1 tablet/day by mouth Daily.   Past Month   • multivitamin with minerals tablet tablet Take 1 tablet by mouth Daily.   Past Month   • traMADol (ULTRAM) 50 MG tablet Take 1 tablet by mouth Every 6 (Six) Hours As Needed for Moderate Pain or Severe Pain. 40 tablet 0 Past Week   • methotrexate 2.5 MG tablet methotrexate sodium 2.5 mg tablet   10/6/2022     Allergies:  Folic acid and Nickel    I have reviewed the patient's medical history, surgical history and family history in the available medical record system.     Current Medications:     Current Facility-Administered Medications   Medication Dose Route Frequency Provider Last Rate Last Admin   • acetaminophen (TYLENOL) tablet 650 mg  650 mg Oral Q4H PRN Jaxon Friedman MD        Or   • acetaminophen (TYLENOL) suppository 650 mg  650 mg Rectal Q4H PRN Jaxon Friedman MD       • aluminum-magnesium hydroxide-simethicone (MAALOX MAX) 400-400-40 MG/5ML suspension 15 mL  15 mL Oral Q6H PRN Jaxon Friedman MD       • [START ON 10/13/2022] aspirin EC tablet 81 mg  81 mg Oral Daily Jaxon Friedman MD       • atorvastatin (LIPITOR) tablet 40 mg  40 mg Oral Nightly Jaxon Friedman MD       • cetirizine (zyrTEC) tablet 10 mg  10 mg Oral Nightly Jaxon Friedman MD       • heparin (porcine) 5000 UNIT/ML injection 5,000 Units   5,000 Units Subcutaneous Q12H Jaxon Friedman MD       • [START ON 10/13/2022] levothyroxine (SYNTHROID, LEVOTHROID) tablet 75 mcg  75 mcg Oral QAM Jaxon Friedman MD       • melatonin tablet 5.25 mg  5.25 mg Oral Nightly PRN Jaxon Friedman MD       • multivitamin with minerals 1 tablet  1 tablet Oral Daily Jaxon Friedman MD       • ondansetron (ZOFRAN) tablet 4 mg  4 mg Oral Q6H PRN Jaxon Friedman MD        Or   • ondansetron (ZOFRAN) injection 4 mg  4 mg Intravenous Q6H PRN Jaxon Friedman MD       • sodium chloride 0.9 % flush 10 mL  10 mL Intravenous Q12H Jaxon Friedman MD       • sodium chloride 0.9 % flush 10 mL  10 mL Intravenous PRN Jaxon Friedman MD       • traMADol (ULTRAM) tablet 50 mg  50 mg Oral Q6H PRN Jaxon Friedman MD           Objective     Physical Exam:   Temp:  [96.7 °F (35.9 °C)] 96.7 °F (35.9 °C)  Heart Rate:  [54-81] 81  Resp:  [18] 18  BP: (166-172)/(72-84) 166/78    Physical Exam:  General Appearance:    Alert, cooperative, in no acute distress   Head:    Normocephalic, without obvious abnormality, atraumatic   Eyes:            Lids and lashes normal, conjunctivae and sclerae normal, no   icterus, no pallor, corneas clear, PERRLA   Ears:    Ears appear intact with no abnormalities noted   Throat:   No oral lesions, no thrush, oral mucosa moist   Neck:   No adenopathy, supple, trachea midline, no thyromegaly, no     carotid bruit, no JVD   Back:     No kyphosis present, no scoliosis present, no skin lesions,       erythema or scars, no tenderness to percussion or                   palpation,   range of motion normal   Lungs:     Clear to auscultation,respirations regular, even and                   unlabored    Heart:    Regular rhythm and normal rate, normal S1 and S2, no            murmur, no gallop, no rub, no click   Breast Exam:    Deferred   Abdomen:     Normal bowel sounds, no masses, no organomegaly, soft        non-tender, non-distended, no guarding, no rebound                  tenderness   Genitalia:    Deferred   Extremities:   Moves all extremities well, no edema, no cyanosis, no              redness   Pulses:   Pulses palpable and equal bilaterally   Skin:   No bleeding, bruising or rash   Lymph nodes:   No palpable adenopathy   Neurologic:   Cranial nerves 2 - 12 grossly intact, sensation intact, DTR        present and equal bilaterally      Results Review:     Lab Results   Component Value Date    WBC 5.40 10/12/2022    WBC 4.84 09/08/2022    HGB 9.1 (L) 10/12/2022    HGB 9.5 (L) 09/08/2022    HCT 30.1 (L) 10/12/2022    HCT 30.6 (L) 09/08/2022     10/12/2022     09/08/2022             No results found for: LIPASE  Lab Results   Component Value Date    INR 1.35 (H) 10/12/2022    INR 1.32 (H) 09/08/2022         Radiology Review:  Imaging Results (Last 72 Hours)     ** No results found for the last 72 hours. **          I reviewed the patient's new clinical results.    I reviewed the patient's new imaging results and agree with the interpretation.     ASSESSMENT/PLAN:   ASSESSMENT: Patient with longstanding history of dysphagia which is worsened over the past few months.  Patient states she did get a pill caught in her esophagus.  Half an hour this finally did go down.  Patient states she has been eating even up till yesterday having Que Harjinder sausage.  Patient does not state this is severe in nature.    PLAN: #1 when patient cleared by cardiology from a cardiac standpoint for endoscopic procedure we will consider EGD with evaluation of distal esophagus to see if any strictures are present and whether this can be dilated.  This can be done as an outpatient  #2 we will advance patient's diet to a soft mechanical diet at this time.  #3 patient be discharged from GI standpoint if cleared by cardiology to follow-up in GI as an outpatient  The risks, benefits, and alternatives of this procedure have been discussed with the patient or the responsible party. The patient  understands and agrees to proceed.         Luke Aragon MD  10/12/22  15:25 CDT     This document has been electronically signed by Luke Aragon MD on October 12, 2022 15:25 CDT

## 2022-10-12 NOTE — ANESTHESIA POSTPROCEDURE EVALUATION
Patient: Umer Frazier    Procedure Summary     Date: 10/12/22 Room / Location: [facility] CATH LAB  / [facility] CATH INVASIVE LOCATION    Anesthesia Start: 1101 Anesthesia Stop: 1129    Procedures:       Stent CONRADO coronary: LAD with/without RCA (Groin)      CASE ABORT Diagnosis:       PVC (premature ventricular contraction)      NSVT (nonsustained ventricular tachycardia)      Coronary artery disease involving native coronary artery of native heart with other form of angina pectoris (HCC)    Providers: Roque Carrillo MD Provider: Lyly Downing CRNA    Anesthesia Type: general, MAC ASA Status: 4          Anesthesia Type: general, MAC    Vitals  No vitals data found for the desired time range.          Post Anesthesia Care and Evaluation    Patient location during evaluation: bedside  Patient participation: complete - patient participated  Level of consciousness: awake  Pain score: 0  Pain management: adequate    Airway patency: patent  Anesthetic complications: No anesthetic complications  PONV Status: none  Cardiovascular status: acceptable and hemodynamically stable  Respiratory status: acceptable and room air  Hydration status: acceptable    Comments: ---------------------------               10/12/22                      0908         ---------------------------   BP:          158/91        Pulse:         87           Resp:          18           Temp:   96.7 °F (35.9 °C)   SpO2:         97%         ---------------------------

## 2022-10-13 ENCOUNTER — APPOINTMENT (OUTPATIENT)
Dept: GENERAL RADIOLOGY | Facility: HOSPITAL | Age: 67
End: 2022-10-13

## 2022-10-13 LAB
ALBUMIN SERPL-MCNC: 2.8 G/DL (ref 3.5–5.2)
ALBUMIN/GLOB SERPL: 0.8 G/DL
ALP SERPL-CCNC: 127 U/L (ref 39–117)
ALT SERPL W P-5'-P-CCNC: 14 U/L (ref 1–33)
ANION GAP SERPL CALCULATED.3IONS-SCNC: 8 MMOL/L (ref 5–15)
AST SERPL-CCNC: 31 U/L (ref 1–32)
BASOPHILS # BLD AUTO: 0.05 10*3/MM3 (ref 0–0.2)
BASOPHILS NFR BLD AUTO: 1 % (ref 0–1.5)
BILIRUB SERPL-MCNC: 0.8 MG/DL (ref 0–1.2)
BUN SERPL-MCNC: 7 MG/DL (ref 8–23)
BUN/CREAT SERPL: 10.3 (ref 7–25)
CALCIUM SPEC-SCNC: 8.2 MG/DL (ref 8.6–10.5)
CHLORIDE SERPL-SCNC: 103 MMOL/L (ref 98–107)
CO2 SERPL-SCNC: 25 MMOL/L (ref 22–29)
CREAT SERPL-MCNC: 0.68 MG/DL (ref 0.57–1)
DEPRECATED RDW RBC AUTO: 56.7 FL (ref 37–54)
EGFRCR SERPLBLD CKD-EPI 2021: 94.4 ML/MIN/1.73
EOSINOPHIL # BLD AUTO: 0.17 10*3/MM3 (ref 0–0.4)
EOSINOPHIL NFR BLD AUTO: 3.4 % (ref 0.3–6.2)
ERYTHROCYTE [DISTWIDTH] IN BLOOD BY AUTOMATED COUNT: 22.5 % (ref 12.3–15.4)
FERRITIN SERPL-MCNC: 15.35 NG/ML (ref 13–150)
GLOBULIN UR ELPH-MCNC: 3.6 GM/DL
GLUCOSE SERPL-MCNC: 83 MG/DL (ref 65–99)
HCT VFR BLD AUTO: 29.6 % (ref 34–46.6)
HGB BLD-MCNC: 8.9 G/DL (ref 12–15.9)
IMM GRANULOCYTES # BLD AUTO: 0.01 10*3/MM3 (ref 0–0.05)
IMM GRANULOCYTES NFR BLD AUTO: 0.2 % (ref 0–0.5)
IRON 24H UR-MRATE: 12 MCG/DL (ref 37–145)
IRON SATN MFR SERPL: 3 % (ref 20–50)
LYMPHOCYTES # BLD AUTO: 1.81 10*3/MM3 (ref 0.7–3.1)
LYMPHOCYTES NFR BLD AUTO: 36 % (ref 19.6–45.3)
MAGNESIUM SERPL-MCNC: 1.8 MG/DL (ref 1.6–2.4)
MCH RBC QN AUTO: 21.7 PG (ref 26.6–33)
MCHC RBC AUTO-ENTMCNC: 30.1 G/DL (ref 31.5–35.7)
MCV RBC AUTO: 72.2 FL (ref 79–97)
MONOCYTES # BLD AUTO: 0.63 10*3/MM3 (ref 0.1–0.9)
MONOCYTES NFR BLD AUTO: 12.5 % (ref 5–12)
NEUTROPHILS NFR BLD AUTO: 2.36 10*3/MM3 (ref 1.7–7)
NEUTROPHILS NFR BLD AUTO: 46.9 % (ref 42.7–76)
NRBC BLD AUTO-RTO: 0 /100 WBC (ref 0–0.2)
PLATELET # BLD AUTO: 120 10*3/MM3 (ref 140–450)
PMV BLD AUTO: ABNORMAL FL
POTASSIUM SERPL-SCNC: 3.3 MMOL/L (ref 3.5–5.2)
POTASSIUM SERPL-SCNC: 4.4 MMOL/L (ref 3.5–5.2)
PROT SERPL-MCNC: 6.4 G/DL (ref 6–8.5)
RBC # BLD AUTO: 4.1 10*6/MM3 (ref 3.77–5.28)
SODIUM SERPL-SCNC: 136 MMOL/L (ref 136–145)
TIBC SERPL-MCNC: 420 MCG/DL (ref 298–536)
TRANSFERRIN SERPL-MCNC: 282 MG/DL (ref 200–360)
TSH SERPL DL<=0.05 MIU/L-ACNC: 0.62 UIU/ML (ref 0.27–4.2)
WBC NRBC COR # BLD: 5.03 10*3/MM3 (ref 3.4–10.8)

## 2022-10-13 PROCEDURE — 63710000001 POTASSIUM CHLORIDE 20 MEQ PACK: Performed by: FAMILY MEDICINE

## 2022-10-13 PROCEDURE — 97116 GAIT TRAINING THERAPY: CPT

## 2022-10-13 PROCEDURE — 84443 ASSAY THYROID STIM HORMONE: CPT | Performed by: FAMILY MEDICINE

## 2022-10-13 PROCEDURE — 92610 EVALUATE SWALLOWING FUNCTION: CPT | Performed by: SPEECH-LANGUAGE PATHOLOGIST

## 2022-10-13 PROCEDURE — 25010000002 INFLUENZA VAC HIGH-DOSE QUAD 0.7 ML SUSPENSION PREFILLED SYRINGE: Performed by: FAMILY MEDICINE

## 2022-10-13 PROCEDURE — 97535 SELF CARE MNGMENT TRAINING: CPT

## 2022-10-13 PROCEDURE — A9270 NON-COVERED ITEM OR SERVICE: HCPCS | Performed by: FAMILY MEDICINE

## 2022-10-13 PROCEDURE — 80053 COMPREHEN METABOLIC PANEL: CPT | Performed by: FAMILY MEDICINE

## 2022-10-13 PROCEDURE — 63710000001 LEVOTHYROXINE 75 MCG TABLET: Performed by: FAMILY MEDICINE

## 2022-10-13 PROCEDURE — 63710000001 ONE-DAILY MULTI-VIT/MINERAL TABLET: Performed by: FAMILY MEDICINE

## 2022-10-13 PROCEDURE — G0378 HOSPITAL OBSERVATION PER HR: HCPCS

## 2022-10-13 PROCEDURE — 84132 ASSAY OF SERUM POTASSIUM: CPT | Performed by: FAMILY MEDICINE

## 2022-10-13 PROCEDURE — 97162 PT EVAL MOD COMPLEX 30 MIN: CPT

## 2022-10-13 PROCEDURE — 85025 COMPLETE CBC W/AUTO DIFF WBC: CPT | Performed by: FAMILY MEDICINE

## 2022-10-13 PROCEDURE — 63710000001 ATORVASTATIN 40 MG TABLET: Performed by: FAMILY MEDICINE

## 2022-10-13 PROCEDURE — 25010000002 HEPARIN (PORCINE) PER 1000 UNITS: Performed by: FAMILY MEDICINE

## 2022-10-13 PROCEDURE — 82728 ASSAY OF FERRITIN: CPT | Performed by: FAMILY MEDICINE

## 2022-10-13 PROCEDURE — 83540 ASSAY OF IRON: CPT | Performed by: FAMILY MEDICINE

## 2022-10-13 PROCEDURE — 63710000001 CETIRIZINE 10 MG TABLET: Performed by: FAMILY MEDICINE

## 2022-10-13 PROCEDURE — 90662 IIV NO PRSV INCREASED AG IM: CPT | Performed by: FAMILY MEDICINE

## 2022-10-13 PROCEDURE — 99219 PR INITIAL OBSERVATION CARE/DAY 50 MINUTES: CPT | Performed by: INTERNAL MEDICINE

## 2022-10-13 PROCEDURE — 84466 ASSAY OF TRANSFERRIN: CPT | Performed by: FAMILY MEDICINE

## 2022-10-13 PROCEDURE — 71045 X-RAY EXAM CHEST 1 VIEW: CPT

## 2022-10-13 PROCEDURE — 0 MAGNESIUM SULFATE 4 GM/100ML SOLUTION: Performed by: FAMILY MEDICINE

## 2022-10-13 PROCEDURE — G0008 ADMIN INFLUENZA VIRUS VAC: HCPCS | Performed by: FAMILY MEDICINE

## 2022-10-13 PROCEDURE — 83735 ASSAY OF MAGNESIUM: CPT | Performed by: FAMILY MEDICINE

## 2022-10-13 PROCEDURE — 63710000001 ASPIRIN 81 MG TABLET DELAYED-RELEASE: Performed by: FAMILY MEDICINE

## 2022-10-13 RX ORDER — POTASSIUM CHLORIDE 750 MG/1
40 CAPSULE, EXTENDED RELEASE ORAL AS NEEDED
Status: DISCONTINUED | OUTPATIENT
Start: 2022-10-13 | End: 2022-10-14 | Stop reason: HOSPADM

## 2022-10-13 RX ORDER — POTASSIUM CHLORIDE 1.5 G/1.77G
40 POWDER, FOR SOLUTION ORAL AS NEEDED
Status: DISCONTINUED | OUTPATIENT
Start: 2022-10-13 | End: 2022-10-14 | Stop reason: HOSPADM

## 2022-10-13 RX ORDER — MAGNESIUM SULFATE HEPTAHYDRATE 40 MG/ML
2 INJECTION, SOLUTION INTRAVENOUS AS NEEDED
Status: DISCONTINUED | OUTPATIENT
Start: 2022-10-13 | End: 2022-10-14 | Stop reason: HOSPADM

## 2022-10-13 RX ORDER — MAGNESIUM SULFATE HEPTAHYDRATE 40 MG/ML
4 INJECTION, SOLUTION INTRAVENOUS AS NEEDED
Status: DISCONTINUED | OUTPATIENT
Start: 2022-10-13 | End: 2022-10-14 | Stop reason: HOSPADM

## 2022-10-13 RX ADMIN — ASPIRIN 81 MG: 81 TABLET, FILM COATED ORAL at 08:07

## 2022-10-13 RX ADMIN — INFLUENZA A VIRUS A/VICTORIA/2570/2019 IVR-215 (H1N1) ANTIGEN (FORMALDEHYDE INACTIVATED), INFLUENZA A VIRUS A/DARWIN/9/2021 SAN-010 (H3N2) ANTIGEN (FORMALDEHYDE INACTIVATED), INFLUENZA B VIRUS B/PHUKET/3073/2013 ANTIGEN (FORMALDEHYDE INACTIVATED), AND INFLUENZA B VIRUS B/MICHIGAN/01/2021 ANTIGEN (FORMALDEHYDE INACTIVATED) 0.7 ML: 60; 60; 60; 60 INJECTION, SUSPENSION INTRAMUSCULAR at 17:57

## 2022-10-13 RX ADMIN — CETIRIZINE HYDROCHLORIDE 10 MG: 10 TABLET, FILM COATED ORAL at 20:37

## 2022-10-13 RX ADMIN — Medication 10 ML: at 20:39

## 2022-10-13 RX ADMIN — Medication 10 ML: at 08:08

## 2022-10-13 RX ADMIN — Medication 1 TABLET: at 08:07

## 2022-10-13 RX ADMIN — HEPARIN SODIUM 5000 UNITS: 5000 INJECTION INTRAVENOUS; SUBCUTANEOUS at 20:37

## 2022-10-13 RX ADMIN — ATORVASTATIN CALCIUM 40 MG: 40 TABLET, FILM COATED ORAL at 20:37

## 2022-10-13 RX ADMIN — HEPARIN SODIUM 5000 UNITS: 5000 INJECTION INTRAVENOUS; SUBCUTANEOUS at 08:07

## 2022-10-13 RX ADMIN — MAGNESIUM SULFATE 4 G: 4 INJECTION INTRAVENOUS at 11:41

## 2022-10-13 RX ADMIN — POTASSIUM CHLORIDE 40 MEQ: 1.5 POWDER, FOR SOLUTION ORAL at 14:37

## 2022-10-13 RX ADMIN — POTASSIUM CHLORIDE 40 MEQ: 1.5 POWDER, FOR SOLUTION ORAL at 09:30

## 2022-10-13 RX ADMIN — LEVOTHYROXINE SODIUM 75 MCG: 75 TABLET ORAL at 06:23

## 2022-10-13 NOTE — THERAPY TREATMENT NOTE
Patient Name: Umer Frazier  : 1955    MRN: [id number]                              Today's Date: 10/13/2022       Admit Date: 10/12/2022    Visit Dx:     ICD-10-CM ICD-9-CM   1. PVC (premature ventricular contraction)  I49.3 427.69   2. NSVT (nonsustained ventricular tachycardia) (Formerly [facility])  I47.2 427.1   3. Coronary artery disease involving native coronary artery of native heart with other form of angina pectoris (Formerly [facility])  I25.118 414.01     413.9   4. Impaired mobility and ADLs  Z74.09 V49.89    Z78.9    5. Dysphagia, unspecified type  R13.10 787.20     Patient Active Problem List   Diagnosis   • Irregular heart beat   • Hypothyroidism   • Rheumatoid arthritis (Formerly [facility])   • PVC (premature ventricular contraction)   • Nonrheumatic aortic valve stenosis   • Nonrheumatic mitral valve regurgitation   • Dyspnea on exertion   • NSVT (nonsustained ventricular tachycardia)   • Coronary artery disease involving native coronary artery of native heart with angina pectoris (Formerly [facility])   • Hypertension   • Anemia   • Dysphagia     Past Medical History:   Diagnosis Date   • Arthritis    • Hypothyroidism    • Murmur, heart      Past Surgical History:   Procedure Laterality Date   • CARDIAC CATHETERIZATION N/A 2022    Procedure: Left and Right Heart Cath;  Surgeon: Roque Carrillo MD;  Location: [facility] CATH INVASIVE LOCATION;  Service: Cardiology;  Laterality: N/A;   • CARDIAC CATHETERIZATION N/A 10/12/2022    Procedure: Stent CONRADO coronary: LAD with/without RCA;  Surgeon: Roque Carrillo MD;  Location: [facility] CATH INVASIVE LOCATION;  Service: Cardiology;  Laterality: N/A;   • CARDIAC CATHETERIZATION  10/12/2022    Procedure: CASE ABORT;  Surgeon: Roque Carrillo MD;  Location: [facility] CATH INVASIVE LOCATION;  Service: Cardiology;;  patient condition   • CYST REMOVAL     • FEMUR SURGERY        General Information     Row Name 10/13/22 0811          OT Time and Intention    Document Type therapy note (daily note)  -ETHAN      Mode of Treatment individual therapy;occupational therapy  -     Row Name 10/13/22 0811          General Information    Patient Profile Reviewed yes  -BB     Existing Precautions/Restrictions fall  -BB     Row Name 10/13/22 0811          Cognition    Orientation Status (Cognition) oriented x 4  -BB     Row Name 10/13/22 0811          Safety Issues, Functional Mobility    Impairments Affecting Function (Mobility) balance;coordination;endurance/activity tolerance;grasp;strength  -BB           User Key  (r) = Recorded By, (t) = Taken By, (c) = Cosigned By    Initials Name Provider Type    BB Olga Puente COTA Occupational Therapist Assistant                 Mobility/ADL's     Row Name 10/13/22 0811          Bed Mobility    Bed Mobility supine-sit;sit-supine  -BB     Supine-Sit [place] (Bed Mobility) minimum assist (75% patient effort)  -     Assistive Device (Bed Mobility) bed rails;head of bed elevated  -     Row Name 10/13/22 0811          Transfers    Transfers sit-stand transfer;stand-sit transfer;toilet transfer  -     Row Name 10/13/22 0811          Sit-Stand Transfer    Sit-Stand [place] (Transfers) contact guard  -BB     Row Name 10/13/22 0811          Stand-Sit Transfer    Stand-Sit [place] (Transfers) contact guard  -BB     Row Name 10/13/22 0811          Toilet Transfer    [place] Level (Toilet Transfer) contact guard  -BB     Row Name 10/13/22 0811          Activities of Daily Living    BADL Assessment/Intervention grooming  -     Row Name 10/13/22 0811          Lower Body Dressing Assessment/Training    [place] Level (Lower Body Dressing) doff;don;socks;standby assist  -BB     Position (Lower Body Dressing) edge of bed sitting  -     Row Name 10/13/22 0811          Toileting Assessment/Training    [place] Level (Toileting) adjust/manage clothing;perform perineal hygiene;independent  -BB     Position (Toileting) supported standing  hand rail  -     Row  Name 10/13/22 0811          Grooming Assessment/Training    [place]coll Level (Grooming) hair care, combing/brushing;oral care regimen;wash face, hands;modified independence  -BB     Position (Grooming) edge of bed sitting  -BB           User Key  (r) = Recorded By, (t) = Taken By, (c) = Cosigned By    Initials Name Provider Type    Olga Smith COTA Occupational Therapist Assistant               Obj/Interventions     Row Name 10/13/22 0811          Range of Motion Comprehensive    General Range of Motion other (see comments)  -BB           User Key  (r) = Recorded By, (t) = Taken By, (c) = Cosigned By    Initials Name Provider Type    Olga Smith COTA Occupational Therapist Assistant               Goals/Plan     Row Name 10/13/22 0811          Transfer Goal 1 (OT)    Activity/Assistive Device (Transfer Goal 1, OT) transfers, all  -BB     Driscoll Level/Cues Needed (Transfer Goal 1, OT) independent  -BB     Time Frame (Transfer Goal 1, OT) long term goal (LTG);by discharge  -BB     Progress/Outcome (Transfer Goal 1, OT) goal not met  -BB     Row Name 10/13/22 0811          Bathing Goal 1 (OT)    Activity/Device (Bathing Goal 1, OT) bathing skills, all  -BB     [place]coll Level/Cues Needed (Bathing Goal 1, OT) independent  -BB     Time Frame (Bathing Goal 1, OT) long term goal (LTG);by discharge  -BB     Progress/Outcomes (Bathing Goal 1, OT) goal not met  -BB     Row Name 10/13/22 0811          Dressing Goal 1 (OT)    Activity/Device (Dressing Goal 1, OT) dressing skills, all  -BB     Driscoll/Cues Needed (Dressing Goal 1, OT) independent  -BB     Time Frame (Dressing Goal 1, OT) long term goal (LTG);by discharge  -BB     Progress/Outcome (Dressing Goal 1, OT) goal not met  -BB     Row Name 10/13/22 0811          Toileting Goal 1 (OT)    Activity/Device (Toileting Goal 1, OT) toileting skills, all  -BB     D[place]coll Level/Cues Needed (Toileting Goal 1, OT) independent  -BB      Time Frame (Toileting Goal 1, OT) long term goal (LTG);by discharge  -BB     Progress/Outcome (Toileting Goal 1, OT) goal met  -BB           User Key  (r) = Recorded By, (t) = Taken By, (c) = Cosigned By    Initials Name Provider Type    BB Olga Puente COTA Occupational Therapist Assistant               Clinical Impression     Row Name 10/13/22 0811          Pain Assessment    Pretreatment Pain Rating 0/10 - no pain  -BB     Posttreatment Pain Rating 0/10 - no pain  -BB     Row Name 10/13/22 08          Plan of Care Review    Plan of Care Reviewed With patient  -BB     Progress improving  -BB     Outcome Evaluation Pt sitting on EOB and agrees to OT. Pt is CGA for bed<>toilet t/f. Pt is Mod I for grooming tasks.Pt defers a bath at this time. Home safety/fall prevention discussed with pt. All needs within reach. Continue OT POC  -BB     Row Name 10/13/22 0811          Therapy Assessment/Plan (OT)    Rehab Potential (OT) good, to achieve stated therapy goals  -BB     Criteria for Skilled Therapeutic Interventions Met (OT) yes;meets criteria;skilled treatment is necessary  -BB     Therapy Frequency (OT) other (see comments)  3-7 d/wk  -BB     Row Name 10/13/22 08          Therapy Plan Review/Discharge Plan (OT)    Anticipated Discharge Disposition (OT) home with 24/7 care;home with assist  -BB     Row Name 10/13/22 0811          Vital Signs    Pre Systolic BP Rehab 168  -BB     Pre Treatment Diastolic BP 78  -BB     Pretreatment Heart Rate (beats/min) 68  -BB     Pre SpO2 (%) 98  -BB     O2 Delivery Pre Treatment room air  -BB     Pre Patient Position Sitting  -BB     Row Name 10/13/22 0811          Positioning and Restraints    Pre-Treatment Position in bed  -BB     Post Treatment Position bed  -BB     In Bed sitting EOB;call light within reach;encouraged to call for assist;exit alarm on  -BB           User Key  (r) = Recorded By, (t) = Taken By, (c) = Cosigned By    Initials Name Provider Type    BB  Olga Puente COTA Occupational Therapist Assistant               Outcome Measures     Row Name 10/13/22 0811          How much help from another is currently needed...    Putting on and taking off regular lower body clothing? 3  -BB     Bathing (including washing, rinsing, and drying) 3  -BB     Toileting (which includes using toilet bed pan or urinal) 3  -BB     Putting on and taking off regular upper body clothing 4  -BB     Taking care of personal grooming (such as brushing teeth) 4  -BB     Eating meals 4  -BB     AM-PAC 6 Clicks Score (OT) 21  -BB     Row Name 10/13/22 0807          How much help from another person do you currently need...    Turning from your back to your side while in flat bed without using bedrails? 4  -AE     Moving from lying on back to sitting on the side of a flat bed without bedrails? 4  -AE     Moving to and from a bed to a chair (including a wheelchair)? 3  -AE     Standing up from a chair using your arms (e.g., wheelchair, bedside chair)? 3  -AE     Climbing 3-5 steps with a railing? 3  -AE     To walk in hospital room? 3  -AE     AM-PAC 6 Clicks Score (PT) 20  -AE           User Key  (r) = Recorded By, (t) = Taken By, (c) = Cosigned By    Initials Name Provider Type    BB Olga Puente COTA Occupational Therapist Assistant    AE Willa Brice RN Registered Nurse                Occupational Therapy Education     Title: PT OT SLP Therapies (Done)     Topic: Occupational Therapy (Done)     Point: ADL training (Done)     Description:   Instruct learner(s) on proper safety adaptation and remediation techniques during self care or transfers.   Instruct in proper use of assistive devices.              Learning Progress Summary           Patient Acceptance, E,TB, VU by BB at 10/13/2022 1415    Acceptance, E,TB, VU by  at 10/12/2022 1610    Comment: OT POC, Role of OT, transfer training                   Point: Home exercise program (Done)     Description:   Instruct  learner(s) on appropriate technique for monitoring, assisting and/or progressing therapeutic exercises/activities.              Learning Progress Summary           Patient Acceptance, E,TB, VU by  at 10/12/2022 1610    Comment: OT POC, Role of OT, transfer training                   Point: Precautions (Done)     Description:   Instruct learner(s) on prescribed precautions during self-care and functional transfers.              Learning Progress Summary           Patient Acceptance, E,TB, VU by  at 10/12/2022 1610    Comment: OT POC, Role of OT, transfer training                   Point: Body mechanics (Done)     Description:   Instruct learner(s) on proper positioning and spine alignment during self-care, functional mobility activities and/or exercises.              Learning Progress Summary           Patient Acceptance, E,TB, VU by  at 10/13/2022 1415    Acceptance, E,TB, VU by  at 10/12/2022 1610    Comment: OT POC, Role of OT, transfer training                               User Key     Initials Effective Dates Name Provider Type Discipline     06/16/21 -  Olga Puente COTA Occupational Therapist Assistant OT     08/11/22 -  Salma Rodriguez OT Occupational Therapist OT              OT Recommendation and Plan  Therapy Frequency (OT): other (see comments) (3-7 d/wk)  Plan of Care Review  Plan of Care Reviewed With: patient  Progress: improving  Outcome Evaluation: Pt sitting on EOB and agrees to OT. Pt is CGA for bed<>toilet t/f. Pt is Mod I for grooming tasks.Pt defers a bath at this time. Home safety/fall prevention discussed with pt. All needs within reach. Continue OT POC     Time Calculation:    Time Calculation- OT     Row Name 10/13/22 1415             Time Calculation- OT    OT Start Time 0811  -BB      OT Stop Time 0850  -BB      OT Time Calculation (min) 39 min  -      Total Timed Code Minutes- OT 39 minute(s)  -BB      OT Received On 10/13/22  -         Timed Charges    56145 - OT  Self Care/Mgmt Minutes 39  -BB         Total Minutes    Timed Charges Total Minutes 39  -BB       Total Minutes 39  -BB            User Key  (r) = Recorded By, (t) = Taken By, (c) = Cosigned By    Initials Name Provider Type    Olga Smith COTA Occupational Therapist Assistant              Therapy Charges for Today     Code Description Service Date Service Provider Modifiers Qty    [id number] HC OT SELF CARE/MGMT/TRAIN EA 15 MIN 10/13/2022 Olga Puente COTA GO 3               ONI Cedeno  10/13/2022

## 2022-10-13 NOTE — H&P (VIEW-ONLY)
[facility] Cardiology Consult    Referring Provider: Roque Carrillo MD    Reason for Consultation: Bradycardia, PVCs    Patient Care Team:  Provider, No Known as PCP - General    Chief complaint       Subjective .     History of present illness:     Annabel Frazier date 67-year-old  female with medical history of rheumatoid arthritis, hypothyroidism who was referred as an outpatient for cardiac evaluation due to irregular heartbeats.  Holter monitor showed abnormal monitor study with frequent PACs and PVCs, 1 episode of nonsustained VT.  Echocardiogram showing EF 55%, moderate AS, mild to moderate MR.  Due to ongoing dyspnea, PVCs and valvular disease she underwent ischemic evaluation on 9-8-2022 which showed severe two-vessel CAD involving the mid LAD and distal RCA.  PCI to the mid LAD had to be aborted during the procedure due to patient's inability to lie still and confusion.  Patient was recommended for medical management and outpatient follow-up.  Patient was taken back to the Cath Lab on 10- for elective PCI.  Unfortunately surgery was aborted.  And then preoperative area the patient had difficulty with swallowing oral potassium tablets.  Case was reviewed with anesthesia staff and decision was made to proceed with elective intubation/mechanical ventilatory support to allow PCI to be placed safely.  Patient was taken to the Cath Lab table and given Versed to prepare for intubation and her heart rate dropped in the 40s.  Decision was made to abort the procedure and admit for observation on telemetry and to further evaluate dysphagia.    On telemetry patient is still having intermittent PVCs, no sustained bradycardia less than 50.  Patient denies chest pain.  She reports shortness of breath is at her baseline.  Denies dizziness, syncope.  Potassium and magnesium are being replaced.    The CVD Risk score (D'Agostino, et al., 2008) failed to calculate for the following reasons:    Cannot find  a previous HDL lab    Cannot find a previous total cholesterol lab      Review of Systems  Review of Systems   Constitutional: Negative for activity change, chills, fatigue, fever and unexpected weight change.   HENT: Negative for hearing loss, nosebleeds, tinnitus, trouble swallowing and voice change.    Eyes: Negative for visual disturbance.   Respiratory: Positive for shortness of breath. Negative for apnea, cough, chest tightness and wheezing.    Cardiovascular: Negative for chest pain, palpitations and leg swelling.   Gastrointestinal: Negative for abdominal distention, abdominal pain and blood in stool.   Endocrine: Negative for cold intolerance, heat intolerance, polydipsia, polyphagia and polyuria.   Genitourinary: Negative.    Musculoskeletal: Negative for arthralgias and back pain.   Skin: Negative.    Allergic/Immunologic: Negative.    Neurological: Negative for seizures, syncope, speech difficulty, numbness and headaches.   Hematological: Negative for adenopathy. Does not bruise/bleed easily.   Psychiatric/Behavioral: Negative for agitation, behavioral problems and suicidal ideas. The patient is not nervous/anxious.        History  Past Medical History:   Diagnosis Date   • Arthritis    • Hypothyroidism    • Murmur, heart    ,   Past Surgical History:   Procedure Laterality Date   • CARDIAC CATHETERIZATION N/A 9/8/2022    Procedure: Left and Right Heart Cath;  Surgeon: Roque Carrillo MD;  Location: [facility] INVASIVE LOCATION;  Service: Cardiology;  Laterality: N/A;   • CARDIAC CATHETERIZATION N/A 10/12/2022    Procedure: Stent CONRADO coronary: LAD with/without RCA;  Surgeon: Roque Carrillo MD;  Location: [facility] INVASIVE LOCATION;  Service: Cardiology;  Laterality: N/A;   • CARDIAC CATHETERIZATION  10/12/2022    Procedure: CASE ABORT;  Surgeon: Roque Carrillo MD;  Location: [facility] CATH INVASIVE LOCATION;  Service: Cardiology;;  patient condition   • CYST REMOVAL     • FEMUR SURGERY   "   ,   Family History   Family history unknown: Yes   ,   Social History     Tobacco Use   • Smoking status: Former     Types: Cigarettes     Quit date:      Years since quittin.8   • Smokeless tobacco: Never   Vaping Use   • Vaping Use: Never used   Substance Use Topics   • Alcohol use: Yes     Comment: ON OCC.   • Drug use: Never   ,   Medications Prior to Admission   Medication Sig Dispense Refill Last Dose   • aspirin 81 MG EC tablet Take 1 tablet by mouth Daily. 90 tablet 1 10/12/2022 at 0530   • atorvastatin (LIPITOR) 40 MG tablet Take 1 tablet by mouth Daily. 90 tablet 1 10/11/2022 at 2100   • cetirizine (zyrTEC) 10 MG tablet Take 10 mg by mouth Daily.   10/11/2022 at 2100   • levothyroxine (SYNTHROID, LEVOTHROID) 75 MCG tablet Take 1 tablet by mouth Daily.   10/12/2022 at 0530   • Multiple Vitamins-Minerals (MACULAR HEALTH FORMULA PO) Take 1 tablet/day by mouth Daily.   Past Month   • multivitamin with minerals tablet tablet Take 1 tablet by mouth Daily.   Past Month   • traMADol (ULTRAM) 50 MG tablet Take 1 tablet by mouth Every 6 (Six) Hours As Needed for Moderate Pain or Severe Pain. 40 tablet 0 Past Week   • methotrexate 2.5 MG tablet methotrexate sodium 2.5 mg tablet   10/6/2022      ALLERGIES  Folic acid and Nickel    Objective     Vital Sign Min/Max for last 24 hours  Temp  Min: 97.5 °F (36.4 °C)  Max: 98.3 °F (36.8 °C)   BP  Min: 125/60  Max: 168/82   Pulse  Min: 46  Max: 84   Resp  Min: 18  Max: 18   SpO2  Min: 94 %  Max: 99 %   No data recorded   Weight  Min: 52.2 kg (115 lb)  Max: 52.2 kg (115 lb)     Flowsheet Rows    Flowsheet Row First Filed Value   Admission Height 160 cm (63\") Documented at 10/12/2022 0908   Admission Weight 53.1 kg (117 lb 1 oz) Documented at 10/12/2022 0908             Physical Exam:  Physical Exam  Vitals and nursing note reviewed.   Constitutional:       General: She is not in acute distress.     Appearance: Normal appearance. She is well-developed. She is not " diaphoretic.   HENT:      Head: Normocephalic.      Right Ear: External ear normal.      Left Ear: External ear normal.   Eyes:      General: Lids are normal.      Pupils: Pupils are equal, round, and reactive to light.   Neck:      Thyroid: No thyromegaly.      Vascular: No carotid bruit or JVD.      Trachea: No tracheal deviation.   Cardiovascular:      Rate and Rhythm: Normal rate and regular rhythm. Occasional extrasystoles are present.     Heart sounds: Murmur heard.    Systolic murmur is present.    No friction rub. No gallop.   Pulmonary:      Effort: Pulmonary effort is normal. No respiratory distress.      Breath sounds: Normal breath sounds. No stridor. No wheezing or rales.   Chest:      Chest wall: No tenderness.   Abdominal:      General: Bowel sounds are normal. There is no distension.      Palpations: Abdomen is soft.      Tenderness: There is no abdominal tenderness.   Musculoskeletal:      Right lower leg: Edema present.      Left lower leg: Edema present.   Skin:     General: Skin is warm and dry.      Findings: No erythema or rash.   Neurological:      Mental Status: She is alert and oriented to person, place, and time.      Cranial Nerves: No cranial nerve deficit.      Deep Tendon Reflexes: Reflexes are normal and symmetric. Reflexes normal.   Psychiatric:         Behavior: Behavior normal.         Thought Content: Thought content normal.         Judgment: Judgment normal.            Results Review:     Results from last 7 days   Lab Units 10/13/22  0647 10/12/22  0916   SODIUM mmol/L 136 138   POTASSIUM mmol/L 3.3* 3.2*   CHLORIDE mmol/L 103 104   CO2 mmol/L 25.0 26.0   BUN mg/dL 7* 9   CREATININE mg/dL 0.68 0.71   CALCIUM mg/dL 8.2* 8.5*   BILIRUBIN mg/dL 0.8  --    ALK PHOS U/L 127*  --    ALT (SGPT) U/L 14  --    AST (SGOT) U/L 31  --    GLUCOSE mg/dL 83 82       Estimated Creatinine Clearance: 64.3 mL/min (by C-G formula based on SCr of 0.68 mg/dL).    Results from last 7 days   Lab Units  10/13/22  0647   MAGNESIUM mg/dL 1.8       Results from last 7 days   Lab Units 10/13/22  0647 10/12/22  0916   WBC 10*3/mm3 5.03 5.40   HEMOGLOBIN g/dL 8.9* 9.1*   PLATELETS 10*3/mm3 120* 140       Results from last 7 days   Lab Units 10/12/22  0916   INR  1.35*       No results found for: CKTOTAL, CKMB, CKMBINDEX, TROPONINI, TROPONINT  No results found for: PROBNP    I/O last 3 completed shifts:  In: 580 [P.O.:480; I.V.:100]  Out: 300 [Urine:300]    Cardiographics  ECG/EMG Results (last 24 hours)     Procedure Component Value Units Date/Time    SCANNED - TELEMETRY   [[id number]] Resulted: 10/12/22     Updated: 10/13/22 1307    SCANNED - TELEMETRY   [[id number]] Resulted: 10/12/22     Updated: 10/13/22 1307    SCANNED - TELEMETRY   [[id number]] Resulted: 10/12/22     Updated: 10/13/22 1307    SCANNED - TELEMETRY   [[id number]] Resulted: 10/12/22     Updated: 10/13/22 1307    SCANNED - TELEMETRY   [[id number]] Resulted: 10/12/22     Updated: 10/13/22 1307    SCANNED - TELEMETRY   [[id number]] Resulted: 10/12/22     Updated: 10/13/22 1307        Results for orders placed in visit on 08/26/22    Adult Transthoracic Echo Complete W/ Cont if Necessary Per Protocol    Interpretation Summary  · Calculated left ventricular 3D EF = 55% Estimated left ventricular EF = 55% Left ventricular ejection fraction appears to be 51 - 55%. Left ventricular systolic function is normal.  · Left ventricular wall thickness is consistent with mild concentric hypertrophy.  · Left atrial volume is mildly increased.  · Moderate aortic valve stenosis is present. Vmax 3.2 m/s, mean gradient 21 mm Hg.  · Mild to moderate mitral valve regurgitation is present.        XR Chest 1 View    Result Date: 10/13/2022  CONCLUSION: Cardiomegaly. Atherosclerotic calcification axillary arteries. [id number] Electronically signed by:  Dereck Ricardo MD  10/13/2022 10:24 AM CDT Workstation: [id number]      Intake/Output       10/12/22 0700 - 10/13/22 0659 10/13/22 0700 -  10/14/22 0659    Intake (ml) 580 360    Output (ml) 300 --    Net (ml) 280 360    Last Weight 52.2 kg (115 lb) --            Assessment & Plan       PVC (premature ventricular contraction)    NSVT (nonsustained ventricular tachycardia)    Coronary artery disease involving native coronary artery of native heart with angina pectoris (HCC)    Hypertension    Dysphagia    #1.  CAD: Patient denies symptoms of angina.  Outpatient cardiac catheterization in September showed severe two-vessel CAD involving the mid LAD and distal RCA.  PCI to the mid LAD had to be aborted during the procedure due to patient confusion and inability to lie still.  Attempted PCI again yesterday and procedure aborted due to difficulty swallowing, she was then given Versed prior to intubation which made her have bradycardia.  Patient was recommended for inpatient observation and GI consult.    Overnight patient did not have any significant bradycardia less than 50 bpm.  She continues to have PVCs.  Dr. Aragon has recommended we proceed with cardiac evaluation prior to EGD colonoscopy.  Continue with medical management and reschedule patient's cardiac catheterization as an outpatient and plan to place temporary pacemaker and have anesthesia sedation and maintain airway.     -Continue aspirin, Lipitor 40mg    #2.  PVCs, nonsustained VT: Patient continues to have PVCs intermittently on the monitor.  No sustained VT.  Potassium and magnesium are being replaced.    #3.  Hypertension: Chronic, moderately controlled.  Not currently on medical therapy.    #4 and #5. AS/MR: Echo showing moderate AAS, mild to moderate MR and possible mild mitral stenosis.  Continue with aspirin.  Outpatient clinical surveillance and work-up per Dr. Carrillo.    #6.  Dysphagia: Outpatient work-up recommended by Dr. Aragon after cardiac clearance.      I discussed the patient's findings and my recommendations with patient and family and Dr. Carrillo. May discharge home from a  cardiac standpoint once electrolytes replaced. Our office will call to reschedule cardiac cath.               This document has been electronically signed by MARLENA Dunn on October 13, 2022 13:39 CDT     Electronically signed by MARLENA Dunn, 10/13/22, 1:39 PM CDT.    I personally saw and examined Umer Frazier after the APRN.  I personally performed a history and physical examination of the patient.  I personally reviewed independent findings and plan of care.  I discussed management with the APRN.  I agree with the APRN's documentation.    No acute events overnight.  She denied any concerning cardiovascular symptoms today.  Telemetry was reviewed. She has not had any further bradycardia, pauses or AV block. PVCs are noted.    Vitals:    10/13/22 1600 10/13/22 1619 10/13/22 1716 10/13/22 1940   BP: 150/82 162/74  157/67   BP Location: Left arm Left arm  Left arm   Patient Position: Sitting Sitting  Lying   Pulse: 71 64 69 76   Resp: 18 18  18   Temp: 98 °F (36.7 °C) 97.8 °F (36.6 °C)  98 °F (36.7 °C)   TempSrc: Temporal Temporal  Temporal   SpO2: 99% 99%  100%   Weight:       Height:         CAD: Continue aspirin and lipitor therapy. Reschedule PCI for next week as outpatient with anesthesia support. She will likely need temporary pacemaker and orbital atherectomy.    PVCs: Continue to monitor for now.    Moderate AS: She is euvolemic at this point. Continue to monitor.     Dysphagia: Further work up per GI as outpatient.        Anemia: Consider iron supplementation if iron deficiency is the cause.     Electronically signed by Roque Carrillo MD, 10/13/22, 9:36 PM CDT.

## 2022-10-13 NOTE — PROGRESS NOTES
[facility] Medicine Services  INPATIENT PROGRESS NOTE    Length of Stay: 0  Date of Admission: 10/12/2022  Primary Care Physician: Provider, No Known    Subjective   Chief Complaint: No new concerns  HPI:  Doing well with no new current concerns. Breathing stable and tolerating diet.     Review of Systems   Constitutional: Positive for activity change. Negative for chills and fever.   HENT: Positive for trouble swallowing. Negative for congestion.    Respiratory: Positive for shortness of breath.    Cardiovascular: Negative for chest pain.   Gastrointestinal: Negative for abdominal pain, constipation, diarrhea and vomiting.   Genitourinary: Negative.    Musculoskeletal: Negative.    Skin: Negative.    Psychiatric/Behavioral: Negative.    All other systems reviewed and are negative.     All pertinent negatives and positives are as above. All other systems have been reviewed and are negative unless otherwise stated.     Objective    As of today 10/13/22  Temp:  [97.5 °F (36.4 °C)-98.3 °F (36.8 °C)] 98.1 °F (36.7 °C)  Heart Rate:  [46-84] 66  Resp:  [18] 18  BP: (125-168)/(60-82) 158/62    Physical Exam  Constitutional:       General: She is not in acute distress.     Appearance: She is not toxic-appearing.   HENT:      Head: Normocephalic and atraumatic.      Right Ear: External ear normal.      Left Ear: External ear normal.      Nose: Nose normal.      Mouth/Throat:      Mouth: Mucous membranes are moist.      Pharynx: Oropharynx is clear.   Eyes:      Conjunctiva/sclera: Conjunctivae normal.   Cardiovascular:      Rate and Rhythm: Normal rate and regular rhythm.      Pulses: Normal pulses.      Heart sounds: Normal heart sounds.   Pulmonary:      Effort: Pulmonary effort is normal. No respiratory distress.      Breath sounds: Normal breath sounds.   Abdominal:      General: Bowel sounds are normal.      Palpations: Abdomen is soft.      Tenderness: There is no abdominal  tenderness.   Skin:     Capillary Refill: Capillary refill takes less than 2 seconds.      Findings: No bruising.   Neurological:      General: No focal deficit present.      Mental Status: She is alert and oriented to person, place, and time. Mental status is at baseline.   Psychiatric:         Behavior: Behavior normal.         Thought Content: Thought content normal.         Results Review:  I have reviewed the labs, radiology results, and diagnostic studies.    Laboratory Data:   Results from last 7 days   Lab Units 10/13/22  0647 10/12/22  0916   SODIUM mmol/L 136 138   POTASSIUM mmol/L 3.3* 3.2*   CHLORIDE mmol/L 103 104   CO2 mmol/L 25.0 26.0   BUN mg/dL 7* 9   CREATININE mg/dL 0.68 0.71   GLUCOSE mg/dL 83 82   CALCIUM mg/dL 8.2* 8.5*   BILIRUBIN mg/dL 0.8  --    ALK PHOS U/L 127*  --    ALT (SGPT) U/L 14  --    AST (SGOT) U/L 31  --    ANION GAP mmol/L 8.0 8.0     Estimated Creatinine Clearance: 64.3 mL/min (by C-G formula based on SCr of 0.68 mg/dL).  Results from last 7 days   Lab Units 10/13/22  0647   MAGNESIUM mg/dL 1.8         Results from last 7 days   Lab Units 10/13/22  0647 10/12/22  0916   WBC 10*3/mm3 5.03 5.40   HEMOGLOBIN g/dL 8.9* 9.1*   HEMATOCRIT % 29.6* 30.1*   PLATELETS 10*3/mm3 120* 140     Results from last 7 days   Lab Units 10/12/22  0916   INR  1.35*       Culture Data:   No results found for: BLOODCX  No results found for: URINECX  No results found for: RESPCX  No results found for: WOUNDCX  No results found for: STOOLCX  No components found for: BODYFLD    Radiology Data:   Imaging Results (Last 24 Hours)     Procedure Component Value Units Date/Time    XR Chest 1 View [[id number]] Collected: 10/13/22 0416     Updated: 10/13/22 1026    Narrative:        PORTABLE CHEST    HISTORY: Shortness of breath. Ventricular prematurity  embolization. Ventricular tachycardia. Angina.    Portable AP upright film of the chest was obtained at 4:29 AM.  COMPARISON: None    FINDINGS:   EKG leads.  The  lungs are clear of an acute process.  Old granulomatous disease is present.  Cardiomegaly.  The pulmonary vasculature is not increased.  No pleural effusion.  No pneumothorax.  No acute osseous abnormality.  Degenerative changes are present in the thoracic spine.  Atherosclerotic calcification axillary arteries.      Impression:      CONCLUSION:  Cardiomegaly.  Atherosclerotic calcification axillary arteries.    [id number]    Electronically signed by:  Dereck Ricardo MD  10/13/2022 10:24 AM  CDT Workstation: [id number]          I have reviewed the patient's current medications.     Assessment/Plan     Principal Problem:    PVC (premature ventricular contraction)  Active Problems:    NSVT (nonsustained ventricular tachycardia)    Coronary artery disease involving native coronary artery of native heart with angina pectoris (HCC)    Hypertension    Dysphagia    Plan:  - Appreciate GI assistance, evaluation at some point with EGD would be reasonable  -Appreciate cardiology assistance, await further recommendations  - Continue PT and OT  - SLP ordered as well  - Home medications as appropriate  - DVT prophylaxis with heparin  - CODE STATUS: Full    I confirmed that the patient's Advance Care Plan is present, code status is documented, or surrogate decision maker is listed in the patient's medical record.     Discharge Planning: In process.    Jaxon Friedman MD

## 2022-10-13 NOTE — NURSING NOTE
"Spoke to ELIJAH Villanueva WC in regards to pt's right leg wound. RN reports \"wrap it in ace wrap from bend of the knee to toes. Have pt follow up with who she sees outpatient.\"  "

## 2022-10-13 NOTE — THERAPY EVALUATION
Patient Name: Umer Frazier  : 1955    MRN: [id number]                              Today's Date: 10/13/2022       Admit Date: 10/12/2022    Visit Dx:     ICD-10-CM ICD-9-CM   1. PVC (premature ventricular contraction)  I49.3 427.69   2. NSVT (nonsustained ventricular tachycardia) ([facility])  I47.2 427.1   3. Coronary artery disease involving native coronary artery of native heart with other form of angina pectoris ([facility])  I25.118 414.01     413.9   4. Impaired mobility and ADLs  Z74.09 V49.89    Z78.9    5. Dysphagia, unspecified type  R13.10 787.20     Patient Active Problem List   Diagnosis   • Irregular heart beat   • Hypothyroidism   • Rheumatoid arthritis ([facility])   • PVC (premature ventricular contraction)   • Nonrheumatic aortic valve stenosis   • Nonrheumatic mitral valve regurgitation   • Dyspnea on exertion   • NSVT (nonsustained ventricular tachycardia)   • Coronary artery disease involving native coronary artery of native heart with angina pectoris ([facility])   • Hypertension   • Anemia   • Dysphagia     Past Medical History:   Diagnosis Date   • Arthritis    • Hypothyroidism    • Murmur, heart      Past Surgical History:   Procedure Laterality Date   • CARDIAC CATHETERIZATION N/A 2022    Procedure: Left and Right Heart Cath;  Surgeon: Roque Carrillo MD;  Location: [facility] CATH INVASIVE LOCATION;  Service: Cardiology;  Laterality: N/A;   • CARDIAC CATHETERIZATION N/A 10/12/2022    Procedure: Stent CONRADO coronary: LAD with/without RCA;  Surgeon: Roque Carrillo MD;  Location: [facility] CATH INVASIVE LOCATION;  Service: Cardiology;  Laterality: N/A;   • CARDIAC CATHETERIZATION  10/12/2022    Procedure: CASE ABORT;  Surgeon: Roque Carrillo MD;  Location: [facility] CATH INVASIVE LOCATION;  Service: Cardiology;;  patient condition   • CYST REMOVAL     • FEMUR SURGERY        General Information     Row Name 10/13/22 7777          Physical Therapy Time and Intention    Document Type evaluation  -MOJGAN      Mode of Treatment individual therapy;physical therapy  -     Row Name 10/13/22 1450          General Information    Patient Profile Reviewed yes  -MOJGAN     Prior Level of Function independent:;feeding;grooming;dressing;bathing;gait;transfer  -MOJGAN     Existing Precautions/Restrictions fall  -     Row Name 10/13/22 1450          Living Environment    People in Home alone;other (see comments)  has neighbors that check on her; most recently, pt staying with nephew  -     Row Name 10/13/22 1450          Home Main Entrance    Number of Stairs, Main Entrance other (see comments)  ramp  -MOJGAN     Stair Railings, Main Entrance none  -     Row Name 10/13/22 1450          Stairs Within Home, Primary    Stairs, Within Home, Primary mostly walks without device, but does have SW RW and cane  -     Number of Stairs, Within Home, Primary none  -MOJGAN     Row Name 10/13/22 1450          Cognition    Orientation Status (Cognition) oriented x 4  -     Row Name 10/13/22 1450          Safety Issues, Functional Mobility    Safety Issues Affecting Function (Mobility) insight into deficits/self-awareness;safety precaution awareness  -     Impairments Affecting Function (Mobility) balance;endurance/activity tolerance;strength  -           User Key  (r) = Recorded By, (t) = Taken By, (c) = Cosigned By    Initials Name Provider Type    Emy Alan, PT Physical Therapist               Mobility     Row Name 10/13/22 1450          Bed Mobility    Bed Mobility supine-sit;sit-supine  -MOJGAN     Supine-Sit [place] (Bed Mobility) standby assist  -     Sit-Supine [place] (Bed Mobility) contact guard  -     Assistive Device (Bed Mobility) bed rails;head of bed elevated  -     Row Name 10/13/22 1450          Bed-Chair Transfer    Bed-Chair [place] (Transfers) --  bed to toilet  -     Row Name 10/13/22 1450          Sit-Stand Transfer    Sit-Stand [place] (Transfers) contact guard  -     Row Name 10/13/22 1450           Gait/Stairs (Locomotion)    [place] Level (Gait) contact guard  -     Deviations/Abnormal Patterns (Gait) gait speed decreased;manoj decreased;base of support, narrow;stride length decreased  -MOJGAN           User Key  (r) = Recorded By, (t) = Taken By, (c) = Cosigned By    Initials Name Provider Type    Emy Alan PT Physical Therapist               Obj/Interventions     Row Name 10/13/22 1450          Range of Motion Comprehensive    Comment, General Range of Motion BLE LE AROM WFL  -MOJGAN     Row Name 10/13/22 1450          Strength Comprehensive (MMT)    Comment, General Manual Muscle Testing (MMT) Assessment BLE: grossly 4-5/  -MOJGAN     Row Name 10/13/22 1450          Balance    Balance Assessment sitting static balance;sitting dynamic balance;sit to stand dynamic balance;standing static balance;standing dynamic balance  -MOJGAN     Static Sitting Balance independent  -MOJGAN     Dynamic Sitting Balance independent  -MOJGAN     Position, Sitting Balance sitting edge of bed  -     Sit to Stand Dynamic Balance contact guard  -     Static Standing Balance standby assist  -     Dynamic Standing Balance contact guard  -MOJGAN     Row Name 10/13/22 1450          Sensory Assessment (Somatosensory)    Sensory Assessment (Somatosensory) LE sensation intact  -           User Key  (r) = Recorded By, (t) = Taken By, (c) = Cosigned By    Initials Name Provider Type    Emy Alan PT Physical Therapist               Goals/Plan     Row Name 10/13/22 1450          Bed Mobility Goal 1 (PT)    Activity/Assistive Device (Bed Mobility Goal 1, PT) sit to supine;supine to sit  -     [place] Level/Cues Needed (Bed Mobility Goal 1, PT) independent  -     Time Frame (Bed Mobility Goal 1, PT) by discharge  -     Progress/Outcomes (Bed Mobility Goal 1, PT) goal not met  -     Row Name 10/13/22 1450          Transfer Goal 1 (PT)    Activity/Assistive Device (Transfer Goal 1, PT)  sit-to-stand/stand-to-sit;bed-to-chair/chair-to-bed  -     [place] Level/Cues Needed (Transfer Goal 1, PT) independent  -MOJGAN     Time Frame (Transfer Goal 1, PT) by discharge  -     Progress/Outcome (Transfer Goal 1, PT) goal not met  -     Row Name 10/13/22 7474          Gait Training Goal 1 (PT)    Activity/Assistive Device (Gait Training Goal 1, PT) gait (walking locomotion);decrease fall risk;increase endurance/gait distance  -     [place] Level (Gait Training Goal 1, PT) modified independence;independent  -MOJGAN     Distance (Gait Training Goal 1, PT) 150ft or more  -     Time Frame (Gait Training Goal 1, PT) by discharge  -     Row Name 10/13/22 8223          Therapy Assessment/Plan (PT)    Planned Therapy Interventions (PT) balance training;bed mobility training;gait training;home exercise program;patient/family education;stair training;strengthening;transfer training  -           User Key  (r) = Recorded By, (t) = Taken By, (c) = Cosigned By    Initials Name Provider Type    Emy Alan, PT Physical Therapist               Clinical Impression     Row Name 10/13/22 4287          Pain    Pretreatment Pain Rating 0/10 - no pain  -     Posttreatment Pain Rating 0/10 - no pain  -     Additional Documentation Pain Scale: Numbers Pre/Post-Treatment (Group)  -     Row Name 10/13/22 8186          Plan of Care Review    Plan of Care Reviewed With patient  -     Outcome Evaluation PT evaluation completed. Pt pleasant and agreeable to therapy. Pt was mostly walking without device prior but does have cane and RW. Pt transferred supine to sit  with SBA and sit to supine with CGA. Pt ambulated to/from bathroom with CGA. Pt SBA for on/off toilet. Pt independent with pericare. Pt ambulated 80 ft with CGA. Pt mildly SOA on room air; O2 sats remained in 90's;HR ranged from 66 to 72 bpm. Function limited by decreased strength, balance and tolerance for functional mobility and activities. Pt  will benefit from PT to improve transfers, gait and decrease fall risk. Anticipate home with assistance at discharge.  -     Row Name 10/13/22 1450          Therapy Assessment/Plan (PT)    Patient/Family Therapy Goals Statement (PT) return to PLOF  -     Rehab Potential (PT) good, to achieve stated therapy goals  -     Criteria for Skilled Interventions Met (PT) yes;meets criteria;skilled treatment is necessary  -     Therapy Frequency (PT) other (see comments)  5-7 days/wk  -     Predicted Duration of Therapy Intervention (PT) until discharge or goals met  -     Row Name 10/13/22 1450          Vital Signs    Pre Systolic BP Rehab 162  -MOJGAN     Pre Treatment Diastolic BP 80  -     Post Systolic BP Rehab 150  -     Post Treatment Diastolic BP 82  -MOJGAN     Pretreatment Heart Rate (beats/min) 66  -MOJGAN     Intratreatment Heart Rate (beats/min) 55  -MOJGAN     Posttreatment Heart Rate (beats/min) 71  -MOJGAN     Pre SpO2 (%) 100  -MOJGAN     O2 Delivery Pre Treatment room air  -MOJGAN     Intra SpO2 (%) 99  -MOJGAN     O2 Delivery Intra Treatment room air  -MOJGAN     Post SpO2 (%) 99  -MOJGAN     O2 Delivery Post Treatment room air  -MOJGAN     Pre Patient Position Sitting  -MOJGAN     Intra Patient Position Sitting  -MOJGAN     Post Patient Position Supine  -     Row Name 10/13/22 1450          Positioning and Restraints    Pre-Treatment Position in bed  -     Post Treatment Position bed  -MOJGAN     In Bed supine;call light within reach;encouraged to call for assist  -           User Key  (r) = Recorded By, (t) = Taken By, (c) = Cosigned By    Initials Name Provider Type    MOJGAN Emy Cruz, PT Physical Therapist               Outcome Measures     Row Name 10/13/22 1450 10/13/22 0807       How much help from another person do you currently need...    Turning from your back to your side while in flat bed without using bedrails? 4  -MOJGAN 4  -AE    Moving from lying on back to sitting on the side of a flat bed without bedrails? 4  -MOJGAN 4  -AE     Moving to and from a bed to a chair (including a wheelchair)? 3  -MOJGAN 3  -AE    Standing up from a chair using your arms (e.g., wheelchair, bedside chair)? 3  -MOJGAN 3  -AE    Climbing 3-5 steps with a railing? 3  -MOJGAN 3  -AE    To walk in hospital room? 3  -MOJGAN 3  -AE    AM-PAC 6 Clicks Score (PT) 20  - 20  -AE    Highest level of mobility 6 --> Walked 10 steps or more  -MOJGAN 6 --> Walked 10 steps or more  -AE    Row Name 10/13/22 1450          Functional Assessment    Outcome Measure Options AM-PAC 6 Clicks Basic Mobility (PT)  -           User Key  (r) = Recorded By, (t) = Taken By, (c) = Cosigned By    Initials Name Provider Type    MOJGAN Emy Cruz, PT Physical Therapist    Willa Melendez, RN Registered Nurse                             Physical Therapy Education     Title: PT OT SLP Therapies (In Progress)     Topic: Physical Therapy (In Progress)     Point: Mobility training (In Progress)     Learning Progress Summary           Patient Acceptance, E, NR by  at 10/13/2022 1726                   Point: Home exercise program (Not Started)     Learner Progress:  Not documented in this visit.          Point: Body mechanics (In Progress)     Learning Progress Summary           Patient Acceptance, E, NR by  at 10/13/2022 1726                   Point: Precautions (In Progress)     Learning Progress Summary           Patient Acceptance, E, NR by  at 10/13/2022 1726                               User Key     Initials Effective Dates Name Provider Type Discipline     06/16/21 -  Emy Cruz, PT Physical Therapist PT              PT Recommendation and Plan  Planned Therapy Interventions (PT): balance training, bed mobility training, gait training, home exercise program, patient/family education, stair training, strengthening, transfer training  Plan of Care Reviewed With: patient  Outcome Evaluation: PT evaluation completed. Pt pleasant and agreeable to therapy. Pt was mostly walking without device prior but  does have cane and RW. Pt transferred supine to sit  with SBA and sit to supine with CGA. Pt ambulated to/from bathroom with CGA. Pt SBA for on/off toilet. Pt independent with pericare. Pt ambulated 80 ft with CGA. Pt mildly SOA on room air; O2 sats remained in 90's;HR ranged from 66 to 72 bpm. Function limited by decreased strength, balance and tolerance for functional mobility and activities. Pt will benefit from PT to improve transfers, gait and decrease fall risk. Anticipate home with assistance at discharge.     Time Calculation:         PT G-Codes  Outcome Measure Options: AM-PAC 6 Clicks Basic Mobility (PT)  AM-PAC 6 Clicks Score (PT): 20  AM-PAC 6 Clicks Score (OT): 21    Emy Cruz, PT  10/13/2022

## 2022-10-13 NOTE — PLAN OF CARE
Goal Outcome Evaluation:  Plan of Care Reviewed With: patient           Outcome Evaluation: Pt seen for skilled ST services this date to assess swallow function d/t reports of frequent choking.  Pt does have a hx of dysphagia, but also admits that even if she knows she will get choked that she will choose to eat the food anyway.  Dr. Solitario does have a plan for pt to have EGD soon as an outpt for possible stricture and dialtion if needed.  After patient described foods that give her diffiiculty, SLP suspected that mixed consistencies were the issue.  Pt consumed mech soft solids (dry cereal) w/no difficulty and no coughing.  Milk was added to cereal and pt demonstrated throat clear x1 and cough x1.  SLP recommends diet of mech soft solids w/chopped meats, but to avoid mixed consistencies.  Pt did admit that dylon may or may not avoid them.  She stated that she allows for coughing episodes 3x when out to eat and then will take the food home and cough at her own leisure.  SLP also recommends pt keep f/u w/Dr. Solitario for EGD.  SLP discussed options and strategies to reduce r/o aspiration on mixed consistencies if pt chooses to continue to consume at home.  Pt and nephew educated on recommendations and were in agreement.

## 2022-10-13 NOTE — THERAPY EVALUATION
Patient Name: Umer Frazier  : 1955    MRN: [id number]                              Today's Date: 10/13/2022       Admit Date: 10/12/2022    Visit Dx:     ICD-10-CM ICD-9-CM   1. PVC (premature ventricular contraction)  I49.3 427.69   2. NSVT (nonsustained ventricular tachycardia) ([facility])  I47.2 427.1   3. Coronary artery disease involving native coronary artery of native heart with other form of angina pectoris ([facility])  I25.118 414.01     413.9   4. Impaired mobility and ADLs  Z74.09 V49.89    Z78.9    5. Dysphagia, unspecified type  R13.10 787.20   6. Impaired functional mobility, balance, gait, and endurance  Z74.09 V49.89     Patient Active Problem List   Diagnosis   • Irregular heart beat   • Hypothyroidism   • Rheumatoid arthritis ([facility])   • PVC (premature ventricular contraction)   • Nonrheumatic aortic valve stenosis   • Nonrheumatic mitral valve regurgitation   • Dyspnea on exertion   • NSVT (nonsustained ventricular tachycardia)   • Coronary artery disease involving native coronary artery of native heart with angina pectoris ([facility])   • Hypertension   • Anemia   • Dysphagia     Past Medical History:   Diagnosis Date   • Arthritis    • Hypothyroidism    • Murmur, heart      Past Surgical History:   Procedure Laterality Date   • CARDIAC CATHETERIZATION N/A 2022    Procedure: Left and Right Heart Cath;  Surgeon: Roque Carrillo MD;  Location: [facility] INVASIVE LOCATION;  Service: Cardiology;  Laterality: N/A;   • CARDIAC CATHETERIZATION N/A 10/12/2022    Procedure: Stent CONRADO coronary: LAD with/without RCA;  Surgeon: Roque Carrillo MD;  Location: [facility] CATH INVASIVE LOCATION;  Service: Cardiology;  Laterality: N/A;   • CARDIAC CATHETERIZATION  10/12/2022    Procedure: CASE ABORT;  Surgeon: Roque Carrillo MD;  Location: [facility] CATH INVASIVE LOCATION;  Service: Cardiology;;  patient condition   • CYST REMOVAL     • FEMUR SURGERY        General Information     Row Name 10/13/22 5536           Physical Therapy Time and Intention    Document Type evaluation  -     Mode of Treatment individual therapy;physical therapy  -     Row Name 10/13/22 1450          General Information    Patient Profile Reviewed yes  -MOJGAN     Prior Level of Function independent:;feeding;grooming;dressing;bathing;gait;transfer  -     Existing Precautions/Restrictions fall  -MOJGAN     Row Name 10/13/22 1450          Living Environment    People in Home alone;other (see comments)  has neighbors that check on her; most recently, pt staying with nephew  -MOJGAN     Row Name 10/13/22 1450          Home Main Entrance    Number of Stairs, Main Entrance other (see comments)  ramp  -MOJGAN     Stair Railings, Main Entrance none  -MOJGAN     Row Name 10/13/22 1450          Stairs Within Home, Primary    Stairs, Within Home, Primary mostly walks without device, but does have SW RW and cane  -     Number of Stairs, Within Home, Primary none  -MOJGAN     Row Name 10/13/22 1450          Cognition    Orientation Status (Cognition) oriented x 4  -     Row Name 10/13/22 1450          Safety Issues, Functional Mobility    Safety Issues Affecting Function (Mobility) insight into deficits/self-awareness;safety precaution awareness  -     Impairments Affecting Function (Mobility) balance;endurance/activity tolerance;strength  -           User Key  (r) = Recorded By, (t) = Taken By, (c) = Cosigned By    Initials Name Provider Type    MOJGAN Emy Cruz, PT Physical Therapist               Mobility     Row Name 10/13/22 1450          Bed Mobility    Bed Mobility supine-sit;sit-supine  -     Supine-Sit Taylor (Bed Mobility) standby assist  -     Sit-Supine Taylor (Bed Mobility) contact guard  -     Assistive Device (Bed Mobility) bed rails;head of bed elevated  -     Row Name 10/13/22 1450          Bed-Chair Transfer    Bed-Chair Taylor (Transfers) --  bed to toilet  -     Row Name 10/13/22 1450          Sit-Stand Transfer     Sit-Stand [place] (Transfers) contact guard  -MOJGAN     Row Name 10/13/22 1450          Gait/Stairs (Locomotion)    [place] Level (Gait) contact guard  -     Deviations/Abnormal Patterns (Gait) gait speed decreased;manoj decreased;base of support, narrow;stride length decreased  -           User Key  (r) = Recorded By, (t) = Taken By, (c) = Cosigned By    Initials Name Provider Type    Emy Alan PT Physical Therapist               Obj/Interventions     Row Name 10/13/22 1450          Range of Motion Comprehensive    Comment, General Range of Motion BLE LE AROM WFL  -     Row Name 10/13/22 1450          Strength Comprehensive (MMT)    Comment, General Manual Muscle Testing (MMT) Assessment BLE: grossly 4-5/  -     Row Name 10/13/22 1450          Balance    Balance Assessment sitting static balance;sitting dynamic balance;sit to stand dynamic balance;standing static balance;standing dynamic balance  -     Static Sitting Balance independent  -MOJGAN     Dynamic Sitting Balance independent  -MOJGAN     Position, Sitting Balance sitting edge of bed  -     Sit to Stand Dynamic Balance contact guard  -     Static Standing Balance standby assist  -     Dynamic Standing Balance contact guard  -MOJGAN     Row Name 10/13/22 1450          Sensory Assessment (Somatosensory)    Sensory Assessment (Somatosensory) LE sensation intact  -           User Key  (r) = Recorded By, (t) = Taken By, (c) = Cosigned By    Initials Name Provider Type    Emy Alan PT Physical Therapist               Goals/Plan     Row Name 10/13/22 1450          Bed Mobility Goal 1 (PT)    Activity/Assistive Device (Bed Mobility Goal 1, PT) sit to supine;supine to sit  -     [place] Level/Cues Needed (Bed Mobility Goal 1, PT) independent  -     Time Frame (Bed Mobility Goal 1, PT) by discharge  -     Progress/Outcomes (Bed Mobility Goal 1, PT) goal not met  -     Row Name 10/13/22 1450          Transfer Goal 1 (PT)     Activity/Assistive Device (Transfer Goal 1, PT) sit-to-stand/stand-to-sit;bed-to-chair/chair-to-bed  -     Larue Level/Cues Needed (Transfer Goal 1, PT) independent  -MOJGAN     Time Frame (Transfer Goal 1, PT) by discharge  -     Progress/Outcome (Transfer Goal 1, PT) goal not met  -     Row Name 10/13/22 2049          Gait Training Goal 1 (PT)    Activity/Assistive Device (Gait Training Goal 1, PT) gait (walking locomotion);decrease fall risk;increase endurance/gait distance  -     [place] Level (Gait Training Goal 1, PT) modified independence;independent  -MOJGAN     Distance (Gait Training Goal 1, PT) 150ft or more  -     Time Frame (Gait Training Goal 1, PT) by discharge  -     Row Name 10/13/22 9741          Therapy Assessment/Plan (PT)    Planned Therapy Interventions (PT) balance training;bed mobility training;gait training;home exercise program;patient/family education;stair training;strengthening;transfer training  -           User Key  (r) = Recorded By, (t) = Taken By, (c) = Cosigned By    Initials Name Provider Type    MOJGAN Emy Cruz, PT Physical Therapist               Clinical Impression     Row Name 10/13/22 2254          Pain    Pretreatment Pain Rating 0/10 - no pain  -     Posttreatment Pain Rating 0/10 - no pain  -     Additional Documentation Pain Scale: Numbers Pre/Post-Treatment (Group)  -     Row Name 10/13/22 5594          Plan of Care Review    Plan of Care Reviewed With patient  -     Outcome Evaluation PT evaluation completed. Pt pleasant and agreeable to therapy. Pt was mostly walking without device prior but does have cane and RW. Pt transferred supine to sit  with SBA and sit to supine with CGA. Pt ambulated to/from bathroom with CGA. Pt SBA for on/off toilet. Pt independent with pericare. Pt ambulated 80 ft with CGA. Pt mildly SOA on room air; O2 sats remained in 90's;HR ranged from 66 to 72 bpm. Function limited by decreased strength, balance and  tolerance for functional mobility and activities. Pt will benefit from PT to improve transfers, gait and decrease fall risk. Anticipate home with assistance at discharge.  -     Row Name 10/13/22 1450          Therapy Assessment/Plan (PT)    Patient/Family Therapy Goals Statement (PT) return to PLOF  -     Rehab Potential (PT) good, to achieve stated therapy goals  -     Criteria for Skilled Interventions Met (PT) yes;meets criteria;skilled treatment is necessary  -     Therapy Frequency (PT) other (see comments)  5-7 days/wk  -     Predicted Duration of Therapy Intervention (PT) until discharge or goals met  -     Row Name 10/13/22 1450          Vital Signs    Pre Systolic BP Rehab 162  -MOJGAN     Pre Treatment Diastolic BP 80  -MOJGAN     Post Systolic BP Rehab 150  -MOJGAN     Post Treatment Diastolic BP 82  -MOJGAN     Pretreatment Heart Rate (beats/min) 66  -MOJGAN     Intratreatment Heart Rate (beats/min) 55  -MOJGAN     Posttreatment Heart Rate (beats/min) 71  -MOJGAN     Pre SpO2 (%) 100  -MOJGAN     O2 Delivery Pre Treatment room air  -MOJGAN     Intra SpO2 (%) 99  -MOJGAN     O2 Delivery Intra Treatment room air  -MOJGAN     Post SpO2 (%) 99  -MOJGNA     O2 Delivery Post Treatment room air  -MOJGAN     Pre Patient Position Sitting  -MOJGAN     Intra Patient Position Sitting  -MOJGAN     Post Patient Position Supine  -     Row Name 10/13/22 1450          Positioning and Restraints    Pre-Treatment Position in bed  -MOJGAN     Post Treatment Position bed  -MOJGAN     In Bed supine;call light within reach;encouraged to call for assist  -           User Key  (r) = Recorded By, (t) = Taken By, (c) = Cosigned By    Initials Name Provider Type    MOJGAN Emy Cruz, PT Physical Therapist               Outcome Measures     Row Name 10/13/22 1450 10/13/22 0807       How much help from another person do you currently need...    Turning from your back to your side while in flat bed without using bedrails? 4  -MOJGAN 4  -AE    Moving from lying on back to sitting on the  side of a flat bed without bedrails? 4  - 4  -AE    Moving to and from a bed to a chair (including a wheelchair)? 3  -MOJGAN 3  -AE    Standing up from a chair using your arms (e.g., wheelchair, bedside chair)? 3  -MOJGAN 3  -AE    Climbing 3-5 steps with a railing? 3  -MOJGAN 3  -AE    To walk in hospital room? 3  -MOJGAN 3  -AE    AM-PAC 6 Clicks Score (PT) 20  - 20  -AE    Highest level of mobility 6 --> Walked 10 steps or more  -MOJGAN 6 --> Walked 10 steps or more  -AE    Row Name 10/13/22 5850          Functional Assessment    Outcome Measure Options AM-PAC 6 Clicks Basic Mobility (PT)  -           User Key  (r) = Recorded By, (t) = Taken By, (c) = Cosigned By    Initials Name Provider Type    MOJGAN Emy Cruz, PT Physical Therapist    Willa Melendez, RN Registered Nurse                             Physical Therapy Education     Title: PT OT SLP Therapies (In Progress)     Topic: Physical Therapy (In Progress)     Point: Mobility training (In Progress)     Learning Progress Summary           Patient Acceptance, E, NR by  at 10/13/2022 1726                   Point: Home exercise program (Not Started)     Learner Progress:  Not documented in this visit.          Point: Body mechanics (In Progress)     Learning Progress Summary           Patient Acceptance, E, NR by  at 10/13/2022 1726                   Point: Precautions (In Progress)     Learning Progress Summary           Patient Acceptance, E, NR by  at 10/13/2022 1726                               User Key     Initials Effective Dates Name Provider Type Discipline     06/16/21 -  Emy Cruz, PT Physical Therapist PT              PT Recommendation and Plan  Planned Therapy Interventions (PT): balance training, bed mobility training, gait training, home exercise program, patient/family education, stair training, strengthening, transfer training  Plan of Care Reviewed With: patient  Outcome Evaluation: PT evaluation completed. Pt pleasant and agreeable to  therapy. Pt was mostly walking without device prior but does have cane and RW. Pt transferred supine to sit  with SBA and sit to supine with CGA. Pt ambulated to/from bathroom with CGA. Pt SBA for on/off toilet. Pt independent with pericare. Pt ambulated 80 ft with CGA. Pt mildly SOA on room air; O2 sats remained in 90's;HR ranged from 66 to 72 bpm. Function limited by decreased strength, balance and tolerance for functional mobility and activities. Pt will benefit from PT to improve transfers, gait and decrease fall risk. Anticipate home with assistance at discharge.     Time Calculation:    PT Charges     Row Name 10/13/22 1730             Time Calculation    Start Time 1450  -MOJGAN      Stop Time 1605  -MOJGAN      Time Calculation (min) 75 min  -MOJGAN      PT Received On 10/13/22  -      PT Goal Re-Cert Due Date 10/26/22  -         Time Calculation- PT    Total Timed Code Minutes- PT 15 minute(s)  -MOJGAN         Timed Charges    68443 - Gait Training Minutes  15  -MOJGAN         Untimed Charges    PT Eval/Re-eval Minutes 60  -MOJGAN         Total Minutes    Timed Charges Total Minutes 15  -MOJGAN      Untimed Charges Total Minutes 60  -MOJGAN       Total Minutes 75  -MOJGAN            User Key  (r) = Recorded By, (t) = Taken By, (c) = Cosigned By    Initials Name Provider Type    MOJGAN Emy Cruz, PT Physical Therapist              Therapy Charges for Today     Code Description Service Date Service Provider Modifiers Qty    31398269933  PT EVAL MOD COMPLEXITY 4 10/13/2022 Emy Cruz, PT GP 1    [id number] HC GAIT TRAINING EA 15 MIN 10/13/2022 Emy Cruz, PT GP 1          PT G-Codes  Outcome Measure Options: AM-PAC 6 Clicks Basic Mobility (PT)  AM-PAC 6 Clicks Score (PT): 20  AM-PAC 6 Clicks Score (OT): 21    Emy Cruz PT  10/13/2022

## 2022-10-13 NOTE — PLAN OF CARE
Problem: Adult Inpatient Plan of Care  Goal: Plan of Care Review  Recent Flowsheet Documentation  Taken 10/13/2022 1450 by Emy Cruz PT  Plan of Care Reviewed With: patient  Outcome Evaluation:   PT evaluation completed. Pt pleasant and agreeable to therapy. Pt was mostly walking without device prior but does have cane and RW. Pt transferred supine to sit  with SBA and sit to supine with CGA. Pt ambulated to/from bathroom with CGA. Pt SBA for on/off toilet. Pt independent with pericare. Pt ambulated 80 ft with CGA. Pt mildly SOA on room air   O2 sats remained in 90's   HR ranged from 66 to 72 bpm. Function limited by decreased strength, balance and tolerance for functional mobility and activities. Pt will benefit from PT to improve transfers, gait and decrease fall risk. Anticipate home with assistance at discharge.   Goal Outcome Evaluation:  Plan of Care Reviewed With: patient           Outcome Evaluation: PT evaluation completed. Pt pleasant and agreeable to therapy. Pt was mostly walking without device prior but does have cane and RW. Pt transferred supine to sit  with SBA and sit to supine with CGA. Pt ambulated to/from bathroom with CGA. Pt SBA for on/off toilet. Pt independent with pericare. Pt ambulated 80 ft with CGA. Pt mildly SOA on room air; O2 sats remained in 90's;HR ranged from 66 to 72 bpm. Function limited by decreased strength, balance and tolerance for functional mobility and activities. Pt will benefit from PT to improve transfers, gait and decrease fall risk. Anticipate home with assistance at discharge.

## 2022-10-13 NOTE — PLAN OF CARE
Goal Outcome Evaluation:              Outcome Evaluation: Mag and potassium is being replaced. Recheck Mag in AM per order. K+ is to be rechecked at 18:37. Pt reports no pain at this time. Pt has been able to work with PT. Picture took of ROSLYN Villanueva RN/ POPEYE notified of pt's wound. Possible dc in AM per MD. Will continue to monitor.

## 2022-10-13 NOTE — THERAPY DISCHARGE NOTE
Acute Care - Speech Language Pathology   Swallow Initial Evaluation/Discharge [facility]     Patient Name: Umer Frazier  : 1955  MRN: [id number]  Today's Date: 10/13/2022               Admit Date: 10/12/2022     Pt seen for skilled ST services this date to assess swallow function d/t reports of frequent choking.  Pt does have a hx of dysphagia, but also admits that even if she knows she will get choked that she will choose to eat the food anyway.  Dr. Solitario does have a plan for pt to have EGD soon as an outpt for possible stricture and dialtion if needed.  After patient described foods that give her diffiiculty, SLP suspected that mixed consistencies were the issue.  Pt consumed mech soft solids (dry cereal) w/no difficulty and no coughing.  Milk was added to cereal and pt demonstrated throat clear x1 and cough x1.  SLP recommends diet of mech soft solids w/chopped meats, but to avoid mixed consistencies.  Pt did admit that dyoln may or may not avoid them.  She stated that she allows for coughing episodes 3x when out to eat and then will take the food home and cough at her own leisure.  SLP also recommends pt keep f/u w/Dr. Solitario for EGD to see if symptoms resolve.  If difficulty remains after EGD and possible dilation; request outpt skilled ST eval from PCP.  SLP discussed options and strategies to reduce r/o aspiration on mixed consistencies if pt chooses to continue to consume at home.  Pt and nephew educated on recommendations and were in agreement.    Visit Dx:    ICD-10-CM ICD-9-CM   1. PVC (premature ventricular contraction)  I49.3 427.69   2. NSVT (nonsustained ventricular tachycardia) ([facility])  I47.2 427.1   3. Coronary artery disease involving native coronary artery of native heart with other form of angina pectoris ([facility])  I25.118 414.01     413.9   4. Impaired mobility and ADLs  Z74.09 V49.89    Z78.9    5. Dysphagia, unspecified type  R13.10 787.20     Patient Active Problem List    Diagnosis   • Irregular heart beat   • Hypothyroidism   • Rheumatoid arthritis (HCC)   • PVC (premature ventricular contraction)   • Nonrheumatic aortic valve stenosis   • Nonrheumatic mitral valve regurgitation   • Dyspnea on exertion   • NSVT (nonsustained ventricular tachycardia)   • Coronary artery disease involving native coronary artery of native heart with angina pectoris (HCC)   • Hypertension   • Anemia   • Dysphagia     Past Medical History:   Diagnosis Date   • Arthritis    • Hypothyroidism    • Murmur, heart      Past Surgical History:   Procedure Laterality Date   • CARDIAC CATHETERIZATION N/A 9/8/2022    Procedure: Left and Right Heart Cath;  Surgeon: Roque Carrillo MD;  Location: [facility] CATH INVASIVE LOCATION;  Service: Cardiology;  Laterality: N/A;   • CARDIAC CATHETERIZATION N/A 10/12/2022    Procedure: Stent CONRADO coronary: LAD with/without RCA;  Surgeon: Roque Carrillo MD;  Location: [facility] CATH INVASIVE LOCATION;  Service: Cardiology;  Laterality: N/A;   • CARDIAC CATHETERIZATION  10/12/2022    Procedure: CASE ABORT;  Surgeon: Roque Carrillo MD;  Location: [facility] CATH INVASIVE LOCATION;  Service: Cardiology;;  patient condition   • CYST REMOVAL     • FEMUR SURGERY         SLP Recommendation and Plan  SLP Swallowing Diagnosis: suspected esophageal dysphagia (10/13/22 0901)  SLP Diet Recommendation: soft textures, mechanical soft with no mixed consistencies, thin liquids (10/13/22 0901)     Monitor for Signs of Aspiration: notify SLP if any concerns (10/13/22 0901)  Recommended Diagnostics: No further SLP services recommended (10/13/22 0901)  Swallow Criteria for Skilled Therapeutic Interventions Met: other (see comments) (await for EGD) (10/13/22 0901)  Anticipated Discharge Disposition (SLP): home with assist (10/13/22 1014)     Therapy Frequency (Swallow): evaluation only (10/13/22 0901)              Anticipated Discharge Disposition (SLP): home with assist (10/13/22 1014)            Reason for Discharge: other (see comments) (Eval only) (10/13/22 1014)             Plan of Care Reviewed With: patient (10/13/22 1016)  Outcome Evaluation: Pt seen for skilled ST services this date to assess swallow function d/t reports of frequent choking.  Pt does have a hx of dysphagia, but also admits that even if she knows she will get choked that she will choose to eat the food anyway.  Dr. Solitario does have a plan for pt to have EGD soon as an outpt for possible stricture and dialtion if needed.  After patient described foods that give her diffiiculty, SLP suspected that mixed consistencies were the issue.  Pt consumed mech soft solids (dry cereal) w/no difficulty and no coughing.  Milk was added to cereal and pt demonstrated throat clear x1 and cough x1.  SLP recommends diet of mech soft solids w/chopped meats, but to avoid mixed consistencies.  Pt did admit that dylon may or may not avoid them.  She stated that she allows for coughing episodes 3x when out to eat and then will take the food home and cough at her own leisure.  SLP also recommends pt keep f/u w/Dr. Solitario for EGD.  SLP discussed options and strategies to reduce r/o aspiration on mixed consistencies if pt chooses to continue to consume at home.  Pt and nephew educated on recommendations and were in agreement. (10/13/22 1016)    SWALLOW EVALUATION (last 72 hours)     SLP Adult Swallow Evaluation     Row Name 10/13/22 0901                   Rehab Evaluation    Document Type evaluation  -CK        Total Evaluation Minutes, SLP 74  -CK        Subjective Information no complaints  -CK        Patient Observations alert;cooperative;agree to therapy  -CK        Patient/Family/Caregiver Comments/Observations Pt's nephew arrived at end of session.  -CK        Patient Effort excellent  -CK           General Information    Patient Profile Reviewed yes  -CK        Pertinent History Of Current Problem Pt has long hx of esophageal dysphagia; seen by   Kassi and has planned for EGD as outpt for possible scriture and dilation if needed.  -CK        Current Method of Nutrition soft textures;thin liquids  -CK        Precautions/Limitations, Vision WFL  -CK        Precautions/Limitations, Hearing WFL  -CK        Prior Level of Function-Communication WFL  -CK        Prior Level of Function-Swallowing esophageal concerns  -CK           Pain Scale: Numbers Pre/Post-Treatment    Pretreatment Pain Rating 0/10 - no pain  -CK        Posttreatment Pain Rating 0/10 - no pain  -CK           Oral Motor Structure and Function    Dentition Assessment missing teeth;teeth are in poor condition  -CK        Secretion Management WNL/WFL  -CK        Mucosal Quality moist, healthy  -CK        Gag Response WFL  -CK        Volitional Swallow WFL  -CK        Volitional Cough WFL  -CK           General Eating/Swallowing Observations    Respiratory Support Currently in Use room air  -CK        Eating/Swallowing Skills self-fed  -CK        Positioning During Eating upright 90 degree;upright in bed  sitting EOB  -CK        Utensils Used straw  -CK        Consistencies Trialed soft textures;mixed consistency;thin liquids  -CK           Clinical Swallow Eval    Oral Prep Phase WFL  -CK        Oral Transit WFL  -CK        Oral Residue WFL  -CK        Pharyngeal Phase suspected pharyngeal impairment  -CK        Esophageal Phase suspected esophageal impairment  -CK        Clinical Swallow Evaluation Summary Pt seen for skilled ST services this date to assess swallow function d/t reports of frequent choking.  Pt does have a hx of dysphagia, but also admits that even if she knows she will get choked that she will choose to eat the food anyway.  Dr. Solitario does have a plan for pt to have EGD soon as an outpt for possible stricture and dialtion if needed.  After patient described foods that give her diffiiculty, SLP suspected that mixed consistencies were the issue.  Pt consumed mech soft solids (dry  cereal) w/no difficulty and no coughing.  Milk was added to cereal and pt demonstrated throat clear x1 and cough x1.  SLP recommends diet of mech soft solids w/chopped meats, but to avoid mixed consistencies.  Pt did admit that dylon may or may not avoid them.  She stated that she allows for coughing episodes 3x when out to eat and then will take the food home and cough at her own leisure.  SLP also recommends pt keep f/u w/Dr. Solitario for EGD.  SLP discussed options and strategies to reduce r/o aspiration on mixed consistencies if pt chooses to continue to consume at home.  Pt and nephew educated on recommendations and were in agreement.  -CK           Pharyngeal Phase Concerns    Pharyngeal Phase Concerns cough;throat clear  -CK        Cough mixed consistencies  -CK        Throat Clear mixed consistencies  -CK           Esophageal Phase Concerns    Esophageal Phase Concerns other (see comments)  Reports of globus; no sensation during eval  -CK           Swallowing Quality of Life Assessment    Education and counseling provided Signs of aspiration;Risks of aspiration;Aspiration precautions;Compensatory strategy recommendations and rationale  -CK           SLP Evaluation Clinical Impression    SLP Swallowing Diagnosis suspected esophageal dysphagia  -CK        Functional Impact risk of aspiration/pneumonia  -CK        Swallow Criteria for Skilled Therapeutic Interventions Met other (see comments)  await for EGD  -CK           SLP Treatment Clinical Impressions    Care Plan Review evaluation/treatment results reviewed;patient/other agree to care plan  -        Care Plan Review, Other Participant(s) family  -           Recommendations    Therapy Frequency (Swallow) evaluation only  -CK        SLP Diet Recommendation soft textures;mechanical soft with no mixed consistencies;thin liquids  -CK        Recommended Diagnostics No further SLP services recommended  -CK        Recommended Precautions and Strategies upright  posture during/after eating;small bites of food and sips of liquid;other (see comments)  no mixed consistencies  -CK        SLP Rec. for Method of Medication Administration as tolerated  -CK        Monitor for Signs of Aspiration notify SLP if any concerns  -CK              User Key  (r) = Recorded By, (t) = Taken By, (c) = Cosigned By    Initials Name Effective Dates    Fany Alberto MS CCC-SLP 06/16/21 -                 EDUCATION  The patient has been educated in the following areas:   Dysphagia (Swallowing Impairment) Modified Diet Instruction.                 Time Calculation:    Time Calculation- SLP     Row Name 10/13/22 1015             Time Calculation- SLP    SLP Start Time 0901  -CK      SLP Stop Time 1015  -CK      SLP Time Calculation (min) 74 min  -CK      Total Timed Code Minutes- SLP 74 minute(s)  -CK      SLP Received On 10/13/22  -CK         Untimed Charges    SLP Eval/Re-eval  ST Eval Oral Pharyng Swallow - 20840  -CK      [id number] Eval Oral Pharyng Swallow Minutes 74  -CK         Total Minutes    Untimed Charges Total Minutes 74  -CK       Total Minutes 74  -CK            User Key  (r) = Recorded By, (t) = Taken By, (c) = Cosigned By    Initials Name Provider Type    Fany Alberto MS CCC-SLP Speech and Language Pathologist                Therapy Charges for Today     Code Description Service Date Service Provider Modifiers Qty    [id number] HC ST EVAL ORAL PHARYNG SWALLOW 5 10/13/2022 Fany Rosario MS CCC-SLP GN 1               SLP Discharge Summary  Anticipated Discharge Disposition (SLP): home with assist  Reason for Discharge: other (see comments) (Eval only)    Fany Rosario MS CCC-SLP  10/13/2022

## 2022-10-13 NOTE — PLAN OF CARE
Problem: Adult Inpatient Plan of Care  Goal: Plan of Care Review  Flowsheets  Taken 10/13/2022 1415  Progress: improving  Plan of Care Reviewed With: patient  Taken 10/13/2022 0811  Progress: improving  Plan of Care Reviewed With: patient  Outcome Evaluation: Pt sitting on EOB and agrees to OT. Pt is CGA for bed<>toilet t/f. Pt is Mod I for grooming tasks.Pt defers a bath at this time. Home safety/fall prevention discussed with pt. All needs within reach. Continue OT POC   Goal Outcome Evaluation:  Plan of Care Reviewed With: patient        Progress: improving  Outcome Evaluation: Pt sitting on EOB and agrees to OT. Pt is CGA for bed<>toilet t/f. Pt is Mod I for grooming tasks.Pt defers a bath at this time. Home safety/fall prevention discussed with pt. All needs within reach. Continue OT POC

## 2022-10-13 NOTE — CONSULTS
[facility] [place] [facility] [place]    Referring Provider: Roque Carrillo MD    Reason for Consultation: Bradycardia, PVCs    Patient Care Team:  Provider, No Known as PCP - General    Chief complaint       Subjective .     History of present illness:     Annabel Frazier date 67-year-old  female with medical history of rheumatoid arthritis, hypothyroidism who was referred as an outpatient for cardiac evaluation due to irregular heartbeats.  Holter monitor showed abnormal monitor study with frequent PACs and PVCs, 1 episode of nonsustained VT.  Echocardiogram showing EF 55%, moderate AS, mild to moderate MR.  Due to ongoing dyspnea, PVCs and valvular disease she underwent ischemic evaluation on 9-8-2022 which showed severe two-vessel CAD involving the mid LAD and distal RCA.  PCI to the mid LAD had to be aborted during the procedure due to patient's inability to lie still and confusion.  Patient was recommended for medical management and outpatient follow-up.  Patient was taken back to the Cath Lab on 10- for elective PCI.  Unfortunately surgery was aborted.  And then preoperative area the patient had difficulty with swallowing oral potassium tablets.  Case was reviewed with anesthesia staff and decision was made to proceed with elective intubation/mechanical ventilatory support to allow PCI to be placed safely.  Patient was taken to the Cath Lab table and given Versed to prepare for intubation and her heart rate dropped in the 40s.  Decision was made to abort the procedure and admit for observation on telemetry and to further evaluate dysphagia.    On telemetry patient is still having intermittent PVCs, no sustained bradycardia less than 50.  Patient denies chest pain.  She reports shortness of breath is at her baseline.  Denies dizziness, syncope.  Potassium and magnesium are being replaced.    The CVD Risk score (D'Agostino, et al., 2008) failed to calculate for the following reasons:    Cannot find  a previous HDL lab    Cannot find a previous total cholesterol lab      Review of Systems  Review of Systems   Constitutional: Negative for activity change, chills, fatigue, fever and unexpected weight change.   HENT: Negative for hearing loss, nosebleeds, tinnitus, trouble swallowing and voice change.    Eyes: Negative for visual disturbance.   Respiratory: Positive for shortness of breath. Negative for apnea, cough, chest tightness and wheezing.    Cardiovascular: Negative for chest pain, palpitations and leg swelling.   Gastrointestinal: Negative for abdominal distention, abdominal pain and blood in stool.   Endocrine: Negative for cold intolerance, heat intolerance, polydipsia, polyphagia and polyuria.   Genitourinary: Negative.    Musculoskeletal: Negative for arthralgias and back pain.   Skin: Negative.    Allergic/Immunologic: Negative.    Neurological: Negative for seizures, syncope, speech difficulty, numbness and headaches.   Hematological: Negative for adenopathy. Does not bruise/bleed easily.   Psychiatric/Behavioral: Negative for agitation, behavioral problems and suicidal ideas. The patient is not nervous/anxious.        History  Past Medical History:   Diagnosis Date   • Arthritis    • Hypothyroidism    • Murmur, heart    ,   Past Surgical History:   Procedure Laterality Date   • CARDIAC CATHETERIZATION N/A 9/8/2022    Procedure: Left and Right Heart Cath;  Surgeon: Roque Carrillo MD;  Location: [facility] INVASIVE LOCATION;  Service: Cardiology;  Laterality: N/A;   • CARDIAC CATHETERIZATION N/A 10/12/2022    Procedure: Stent CONRADO coronary: LAD with/without RCA;  Surgeon: Roque Carrillo MD;  Location: [facility] INVASIVE LOCATION;  Service: Cardiology;  Laterality: N/A;   • CARDIAC CATHETERIZATION  10/12/2022    Procedure: CASE ABORT;  Surgeon: Roque Carrillo MD;  Location: [facility] CATH INVASIVE LOCATION;  Service: Cardiology;;  patient condition   • CYST REMOVAL     • FEMUR SURGERY   "   ,   Family History   Family history unknown: Yes   ,   Social History     Tobacco Use   • Smoking status: Former     Types: Cigarettes     Quit date:      Years since quittin.8   • Smokeless tobacco: Never   Vaping Use   • Vaping Use: Never used   Substance Use Topics   • Alcohol use: Yes     Comment: ON OCC.   • Drug use: Never   ,   Medications Prior to Admission   Medication Sig Dispense Refill Last Dose   • aspirin 81 MG EC tablet Take 1 tablet by mouth Daily. 90 tablet 1 10/12/2022 at 0530   • atorvastatin (LIPITOR) 40 MG tablet Take 1 tablet by mouth Daily. 90 tablet 1 10/11/2022 at 2100   • cetirizine (zyrTEC) 10 MG tablet Take 10 mg by mouth Daily.   10/11/2022 at 2100   • levothyroxine (SYNTHROID, LEVOTHROID) 75 MCG tablet Take 1 tablet by mouth Daily.   10/12/2022 at 0530   • Multiple Vitamins-Minerals (MACULAR HEALTH FORMULA PO) Take 1 tablet/day by mouth Daily.   Past Month   • multivitamin with minerals tablet tablet Take 1 tablet by mouth Daily.   Past Month   • traMADol (ULTRAM) 50 MG tablet Take 1 tablet by mouth Every 6 (Six) Hours As Needed for Moderate Pain or Severe Pain. 40 tablet 0 Past Week   • methotrexate 2.5 MG tablet methotrexate sodium 2.5 mg tablet   10/6/2022      ALLERGIES  Folic acid and Nickel    Objective     Vital Sign Min/Max for last 24 hours  Temp  Min: 97.5 °F (36.4 °C)  Max: 98.3 °F (36.8 °C)   BP  Min: 125/60  Max: 168/82   Pulse  Min: 46  Max: 84   Resp  Min: 18  Max: 18   SpO2  Min: 94 %  Max: 99 %   No data recorded   Weight  Min: 52.2 kg (115 lb)  Max: 52.2 kg (115 lb)     Flowsheet Rows    Flowsheet Row First Filed Value   Admission Height 160 cm (63\") Documented at 10/12/2022 0908   Admission Weight 53.1 kg (117 lb 1 oz) Documented at 10/12/2022 0908             Physical Exam:  Physical Exam  Vitals and nursing note reviewed.   Constitutional:       General: She is not in acute distress.     Appearance: Normal appearance. She is well-developed. She is not " diaphoretic.   HENT:      Head: Normocephalic.      Right Ear: External ear normal.      Left Ear: External ear normal.   Eyes:      General: Lids are normal.      Pupils: Pupils are equal, round, and reactive to light.   Neck:      Thyroid: No thyromegaly.      Vascular: No carotid bruit or JVD.      Trachea: No tracheal deviation.   Cardiovascular:      Rate and Rhythm: Normal rate and regular rhythm. Occasional extrasystoles are present.     Heart sounds: Murmur heard.    Systolic murmur is present.    No friction rub. No gallop.   Pulmonary:      Effort: Pulmonary effort is normal. No respiratory distress.      Breath sounds: Normal breath sounds. No stridor. No wheezing or rales.   Chest:      Chest wall: No tenderness.   Abdominal:      General: Bowel sounds are normal. There is no distension.      Palpations: Abdomen is soft.      Tenderness: There is no abdominal tenderness.   Musculoskeletal:      Right lower leg: Edema present.      Left lower leg: Edema present.   Skin:     General: Skin is warm and dry.      Findings: No erythema or rash.   Neurological:      Mental Status: She is alert and oriented to person, place, and time.      Cranial Nerves: No cranial nerve deficit.      Deep Tendon Reflexes: Reflexes are normal and symmetric. Reflexes normal.   Psychiatric:         Behavior: Behavior normal.         Thought Content: Thought content normal.         Judgment: Judgment normal.            Results Review:     Results from last 7 days   Lab Units 10/13/22  0647 10/12/22  0916   SODIUM mmol/L 136 138   POTASSIUM mmol/L 3.3* 3.2*   CHLORIDE mmol/L 103 104   CO2 mmol/L 25.0 26.0   BUN mg/dL 7* 9   CREATININE mg/dL 0.68 0.71   CALCIUM mg/dL 8.2* 8.5*   BILIRUBIN mg/dL 0.8  --    ALK PHOS U/L 127*  --    ALT (SGPT) U/L 14  --    AST (SGOT) U/L 31  --    GLUCOSE mg/dL 83 82       Estimated Creatinine Clearance: 64.3 mL/min (by C-G formula based on SCr of 0.68 mg/dL).    Results from last 7 days   Lab Units  10/13/22  0647   MAGNESIUM mg/dL 1.8       Results from last 7 days   Lab Units 10/13/22  0647 10/12/22  0916   WBC 10*3/mm3 5.03 5.40   HEMOGLOBIN g/dL 8.9* 9.1*   PLATELETS 10*3/mm3 120* 140       Results from last 7 days   Lab Units 10/12/22  0916   INR  1.35*       No results found for: CKTOTAL, CKMB, CKMBINDEX, TROPONINI, TROPONINT  No results found for: PROBNP    I/O last 3 completed shifts:  In: 580 [P.O.:480; I.V.:100]  Out: 300 [Urine:300]    Cardiographics  ECG/EMG Results (last 24 hours)     Procedure Component Value Units Date/Time    SCANNED - TELEMETRY   [[id number]] Resulted: 10/12/22     Updated: 10/13/22 1307    SCANNED - TELEMETRY   [[id number]] Resulted: 10/12/22     Updated: 10/13/22 1307    SCANNED - TELEMETRY   [[id number]] Resulted: 10/12/22     Updated: 10/13/22 1307    SCANNED - TELEMETRY   [[id number]] Resulted: 10/12/22     Updated: 10/13/22 1307    SCANNED - TELEMETRY   [[id number]] Resulted: 10/12/22     Updated: 10/13/22 1307    SCANNED - TELEMETRY   [[id number]] Resulted: 10/12/22     Updated: 10/13/22 1307        Results for orders placed in visit on 08/26/22    Adult Transthoracic Echo Complete W/ Cont if Necessary Per Protocol    Interpretation Summary  · Calculated left ventricular 3D EF = 55% Estimated left ventricular EF = 55% Left ventricular ejection fraction appears to be 51 - 55%. Left ventricular systolic function is normal.  · Left ventricular wall thickness is consistent with mild concentric hypertrophy.  · Left atrial volume is mildly increased.  · Moderate aortic valve stenosis is present. Vmax 3.2 m/s, mean gradient 21 mm Hg.  · Mild to moderate mitral valve regurgitation is present.        XR Chest 1 View    Result Date: 10/13/2022  CONCLUSION: Cardiomegaly. Atherosclerotic calcification axillary arteries. [id number] Electronically signed by:  Dereck Ricardo MD  10/13/2022 10:24 AM CDT Workstation: [id number]      Intake/Output       10/12/22 0700 - 10/13/22 0659 10/13/22 0700 -  10/14/22 0659    Intake (ml) 580 360    Output (ml) 300 --    Net (ml) 280 360    Last Weight 52.2 kg (115 lb) --            Assessment & Plan       PVC (premature ventricular contraction)    NSVT (nonsustained ventricular tachycardia)    Coronary artery disease involving native coronary artery of native heart with angina pectoris (HCC)    Hypertension    Dysphagia    #1.  CAD: Patient denies symptoms of angina.  Outpatient cardiac catheterization in September showed severe two-vessel CAD involving the mid LAD and distal RCA.  PCI to the mid LAD had to be aborted during the procedure due to patient confusion and inability to lie still.  Attempted PCI again yesterday and procedure aborted due to difficulty swallowing, she was then given Versed prior to intubation which made her have bradycardia.  Patient was recommended for inpatient observation and GI consult.    Overnight patient did not have any significant bradycardia less than 50 bpm.  She continues to have PVCs.  Dr. Aragon has recommended we proceed with cardiac evaluation prior to EGD colonoscopy.  Continue with medical management and reschedule patient's cardiac catheterization as an outpatient and plan to place temporary pacemaker and have anesthesia sedation and maintain airway.     -Continue aspirin, Lipitor 40mg    #2.  PVCs, nonsustained VT: Patient continues to have PVCs intermittently on the monitor.  No sustained VT.  Potassium and magnesium are being replaced.    #3.  Hypertension: Chronic, moderately controlled.  Not currently on medical therapy.    #4 and #5. AS/MR: Echo showing moderate AAS, mild to moderate MR and possible mild mitral stenosis.  Continue with aspirin.  Outpatient clinical surveillance and work-up per Dr. Carrillo.    #6.  Dysphagia: Outpatient work-up recommended by Dr. Aragon after cardiac clearance.      I discussed the patient's findings and my recommendations with patient and family and Dr. Carrillo. May discharge home from a  cardiac standpoint once electrolytes replaced. Our office will call to reschedule cardiac cath.               This document has been electronically signed by MARLENA Dunn on October 13, 2022 13:39 CDT     Electronically signed by MARLENA Dunn, 10/13/22, 1:39 PM CDT.    I personally saw and examined Umer Frazier after the APRN.  I personally performed a history and physical examination of the patient.  I personally reviewed independent findings and plan of care.  I discussed management with the APRN.  I agree with the APRN's documentation.    No acute events overnight.  She denied any concerning cardiovascular symptoms today.  Telemetry was reviewed. She has not had any further bradycardia, pauses or AV block. PVCs are noted.    Vitals:    10/13/22 1600 10/13/22 1619 10/13/22 1716 10/13/22 1940   BP: 150/82 162/74  157/67   BP Location: Left arm Left arm  Left arm   Patient Position: Sitting Sitting  Lying   Pulse: 71 64 69 76   Resp: 18 18  18   Temp: 98 °F (36.7 °C) 97.8 °F (36.6 °C)  98 °F (36.7 °C)   TempSrc: Temporal Temporal  Temporal   SpO2: 99% 99%  100%   Weight:       Height:         CAD: Continue aspirin and lipitor therapy. Reschedule PCI for next week as outpatient with anesthesia support. She will likely need temporary pacemaker and orbital atherectomy.    PVCs: Continue to monitor for now.    Moderate AS: She is euvolemic at this point. Continue to monitor.     Dysphagia: Further work up per GI as outpatient.        Anemia: Consider iron supplementation if iron deficiency is the cause.     Electronically signed by Roque Carrillo MD, 10/13/22, 9:36 PM CDT.

## 2022-10-14 VITALS
BODY MASS INDEX: 20.32 KG/M2 | RESPIRATION RATE: 16 BRPM | TEMPERATURE: 101.4 F | WEIGHT: 114.7 LBS | SYSTOLIC BLOOD PRESSURE: 142 MMHG | DIASTOLIC BLOOD PRESSURE: 65 MMHG | OXYGEN SATURATION: 95 % | HEART RATE: 95 BPM | HEIGHT: 63 IN

## 2022-10-14 PROBLEM — D50.9 IRON DEFICIENCY ANEMIA: Status: ACTIVE | Noted: 2022-10-14

## 2022-10-14 LAB
MAGNESIUM SERPL-MCNC: 2.2 MG/DL (ref 1.6–2.4)
WHOLE BLOOD HOLD SPECIMEN: NORMAL

## 2022-10-14 PROCEDURE — 97535 SELF CARE MNGMENT TRAINING: CPT

## 2022-10-14 PROCEDURE — 25010000002 HEPARIN (PORCINE) PER 1000 UNITS: Performed by: FAMILY MEDICINE

## 2022-10-14 PROCEDURE — 63710000001 ASPIRIN 81 MG TABLET DELAYED-RELEASE: Performed by: FAMILY MEDICINE

## 2022-10-14 PROCEDURE — 63710000001 LEVOTHYROXINE 75 MCG TABLET: Performed by: FAMILY MEDICINE

## 2022-10-14 PROCEDURE — A9270 NON-COVERED ITEM OR SERVICE: HCPCS | Performed by: FAMILY MEDICINE

## 2022-10-14 PROCEDURE — G0378 HOSPITAL OBSERVATION PER HR: HCPCS

## 2022-10-14 PROCEDURE — 63710000001 ACETAMINOPHEN 325 MG TABLET: Performed by: FAMILY MEDICINE

## 2022-10-14 PROCEDURE — 83735 ASSAY OF MAGNESIUM: CPT | Performed by: FAMILY MEDICINE

## 2022-10-14 PROCEDURE — 63710000001 ONE-DAILY MULTI-VIT/MINERAL TABLET: Performed by: FAMILY MEDICINE

## 2022-10-14 RX ORDER — FERROUS SULFATE TAB EC 324 MG (65 MG FE EQUIVALENT) 324 (65 FE) MG
324 TABLET DELAYED RESPONSE ORAL
Qty: 30 TABLET | Refills: 0 | Status: SHIPPED | OUTPATIENT
Start: 2022-10-14

## 2022-10-14 RX ORDER — ECHINACEA PURPUREA EXTRACT 125 MG
2 TABLET ORAL AS NEEDED
Refills: 12
Start: 2022-10-14

## 2022-10-14 RX ORDER — ECHINACEA PURPUREA EXTRACT 125 MG
2 TABLET ORAL AS NEEDED
Status: DISCONTINUED | OUTPATIENT
Start: 2022-10-14 | End: 2022-10-14 | Stop reason: HOSPADM

## 2022-10-14 RX ADMIN — ASPIRIN 81 MG: 81 TABLET, FILM COATED ORAL at 09:44

## 2022-10-14 RX ADMIN — ACETAMINOPHEN 650 MG: 325 TABLET, FILM COATED ORAL at 11:59

## 2022-10-14 RX ADMIN — Medication 1 TABLET: at 09:44

## 2022-10-14 RX ADMIN — LEVOTHYROXINE SODIUM 75 MCG: 75 TABLET ORAL at 06:16

## 2022-10-14 RX ADMIN — HEPARIN SODIUM 5000 UNITS: 5000 INJECTION INTRAVENOUS; SUBCUTANEOUS at 09:43

## 2022-10-14 RX ADMIN — Medication 10 ML: at 09:44

## 2022-10-14 NOTE — THERAPY TREATMENT NOTE
Patient Name: Umer Frazier  : 1955    MRN: [id number]                              Today's Date: 10/14/2022       Admit Date: 10/12/2022    Visit Dx:     ICD-10-CM ICD-9-CM   1. Iron deficiency anemia, unspecified iron deficiency anemia type  D50.9 280.9   2. PVC (premature ventricular contraction)  I49.3 427.69   3. NSVT (nonsustained ventricular tachycardia) ([facility])  I47.2 427.1   4. Coronary artery disease involving native coronary artery of native heart with other form of angina pectoris ([facility])  I25.118 414.01     413.9   5. Impaired mobility and ADLs  Z74.09 V49.89    Z78.9    6. Dysphagia, unspecified type  R13.10 787.20   7. Impaired functional mobility, balance, gait, and endurance  Z74.09 V49.89     Patient Active Problem List   Diagnosis   • Irregular heart beat   • Hypothyroidism   • Rheumatoid arthritis ([facility])   • PVC (premature ventricular contraction)   • Nonrheumatic aortic valve stenosis   • Nonrheumatic mitral valve regurgitation   • Dyspnea on exertion   • NSVT (nonsustained ventricular tachycardia)   • Coronary artery disease involving native coronary artery of native heart with angina pectoris ([facility])   • Hypertension   • Anemia   • Dysphagia   • Iron deficiency anemia     Past Medical History:   Diagnosis Date   • Arthritis    • Hypothyroidism    • Murmur, heart      Past Surgical History:   Procedure Laterality Date   • CARDIAC CATHETERIZATION N/A 2022    Procedure: Left and Right Heart Cath;  Surgeon: Roque Carrillo MD;  Location: [facility] INVASIVE LOCATION;  Service: Cardiology;  Laterality: N/A;   • CARDIAC CATHETERIZATION N/A 10/12/2022    Procedure: Stent CONRADO coronary: LAD with/without RCA;  Surgeon: Roque Carrillo MD;  Location: [facility] CATH INVASIVE LOCATION;  Service: Cardiology;  Laterality: N/A;   • CARDIAC CATHETERIZATION  10/12/2022    Procedure: CASE ABORT;  Surgeon: Roque Carrillo MD;  Location: [facility] CATH INVASIVE LOCATION;  Service: Cardiology;;   patient condition   • CYST REMOVAL     • FEMUR SURGERY        General Information     Row Name 10/14/22 0927          OT Time and Intention    Document Type therapy note (daily note)  -BB     Mode of Treatment individual therapy;occupational therapy  -BB     Row Name 10/14/22 0927          General Information    Patient Profile Reviewed yes  -BB     Existing Precautions/Restrictions fall  -BB     Row Name 10/14/22 0927          Cognition    Orientation Status (Cognition) oriented x 4  -BB     Row Name 10/14/22 0927          Safety Issues, Functional Mobility    Impairments Affecting Function (Mobility) balance;endurance/activity tolerance;strength  -BB           User Key  (r) = Recorded By, (t) = Taken By, (c) = Cosigned By    Initials Name Provider Type    BB Olga Puente COTA Occupational Therapist Assistant                 Mobility/ADL's     Row Name 10/14/22 0927          Bed Mobility    Bed Mobility supine-sit;sit-supine  -BB     Supine-Sit [place] (Bed Mobility) standby assist  -BB     Sit-Supine [place] (Bed Mobility) contact guard  -BB     Assistive Device (Bed Mobility) bed rails;head of bed elevated  -BB     Row Name 10/14/22 0927          Transfers    Transfers sit-stand transfer;stand-sit transfer;toilet transfer  -BB     Row Name 10/14/22 0927          Bed-Chair Transfer    Bed-Chair [place] (Transfers) --  bed to toilet  -BB     Row Name 10/14/22 0927          Sit-Stand Transfer    Sit-Stand [place] (Transfers) contact guard  -BB     Row Name 10/14/22 0927          Stand-Sit Transfer    Stand-Sit [place] (Transfers) contact guard  -BB     Row Name 10/14/22 0927          Toilet Transfer    [place] Level (Toilet Transfer) contact guard  -BB     Row Name 10/14/22 0927          Lower Body Dressing Assessment/Training    [place] Level (Lower Body Dressing) doff;don;socks;independent  -BB     Position (Lower Body Dressing) edge of bed sitting  -BB     Row Name  10/14/22 0927          Toileting Assessment/Training    [place] Level (Toileting) adjust/manage clothing;perform perineal hygiene;independent  -BB     Row Name 10/14/22 0927          Grooming Assessment/Training    [place] Level (Grooming) hair care, combing/brushing;oral care regimen;wash face, hands;modified independence  -BB     Position (Grooming) edge of bed sitting  -BB           User Key  (r) = Recorded By, (t) = Taken By, (c) = Cosigned By    Initials Name Provider Type    Olga Smith COTA Occupational Therapist Assistant               Obj/Interventions     Row Name 10/14/22 0927          Range of Motion Comprehensive    General Range of Motion other (see comments)  -BB           User Key  (r) = Recorded By, (t) = Taken By, (c) = Cosigned By    Initials Name Provider Type    Olga Smith COTA Occupational Therapist Assistant               Goals/Plan     Row Name 10/14/22 0927          Transfer Goal 1 (OT)    Activity/Assistive Device (Transfer Goal 1, OT) transfers, all  -BB     [place] Level/Cues Needed (Transfer Goal 1, OT) independent  -BB     Time Frame (Transfer Goal 1, OT) long term goal (LTG);by discharge  -BB     Progress/Outcome (Transfer Goal 1, OT) goal not met  -BB     Row Name 10/14/22 0927          Bathing Goal 1 (OT)    Activity/Device (Bathing Goal 1, OT) bathing skills, all  -BB     [place] Level/Cues Needed (Bathing Goal 1, OT) independent  -BB     Time Frame (Bathing Goal 1, OT) long term goal (LTG);by discharge  -BB     Progress/Outcomes (Bathing Goal 1, OT) goal not met  -BB     Row Name 10/14/22 0927          Dressing Goal 1 (OT)    Activity/Device (Dressing Goal 1, OT) dressing skills, all  -BB     [place]/Cues Needed (Dressing Goal 1, OT) independent  -BB     Time Frame (Dressing Goal 1, OT) long term goal (LTG);by discharge  -BB     Progress/Outcome (Dressing Goal 1, OT) goal not met  -BB     Row Name 10/14/22 0927          Toileting  Goal 1 (OT)    Activity/Device (Toileting Goal 1, OT) toileting skills, all  -BB     [place] Level/Cues Needed (Toileting Goal 1, OT) independent  -BB     Time Frame (Toileting Goal 1, OT) long term goal (LTG);by discharge  -BB     Progress/Outcome (Toileting Goal 1, OT) goal met  -BB           User Key  (r) = Recorded By, (t) = Taken By, (c) = Cosigned By    Initials Name Provider Type    BB Olga Puente COTA Occupational Therapist Assistant               Clinical Impression     Row Name 10/14/22 0927          Pain Assessment    Pretreatment Pain Rating 0/10 - no pain  -BB     Posttreatment Pain Rating 0/10 - no pain  -BB     Row Name 10/14/22 0927          Plan of Care Review    Plan of Care Reviewed With patient  -BB     Progress improving  -BB     Outcome Evaluation Pt is CGA for bed<>toilet t/f with no AD. Pt performed grooming task with [place]. Pt is Independent for doffing/donning socks. Pt wants to wait on a bath to see if she is going home. Continue OT POC  -BB     Row Name 10/14/22 0927          Therapy Assessment/Plan (OT)    Rehab Potential (OT) good, to achieve stated therapy goals  -BB     Criteria for Skilled Therapeutic Interventions Met (OT) yes;meets criteria;skilled treatment is necessary  -BB     Therapy Frequency (OT) other (see comments)  3-7 d/wk  -BB     Row Name 10/14/22 0927          Therapy Plan Review/Discharge Plan (OT)    Anticipated Discharge Disposition (OT) home with 24/7 care;home with assist  -BB     Row Name 10/14/22 0927          Vital Signs    Posttreatment Heart Rate (beats/min) 73  -BB     Pre SpO2 (%) 99  -BB     O2 Delivery Pre Treatment room air  -BB     Pre Patient Position Sitting  -BB     Row Name 10/14/22 0927          Positioning and Restraints    Pre-Treatment Position in bed  -BB     Post Treatment Position bed  -BB     In Bed sitting EOB;call light within reach;exit alarm on;encouraged to call for assist  -BB           User Key  (r) = Recorded By,  (t) = Taken By, (c) = Cosigned By    Initials Name Provider Type    Olga Smith COTA Occupational Therapist Assistant               Outcome Measures     Row Name 10/14/22 0927          How much help from another is currently needed...    Putting on and taking off regular lower body clothing? 4  -BB     Bathing (including washing, rinsing, and drying) 3  -BB     Toileting (which includes using toilet bed pan or urinal) 4  -BB     Putting on and taking off regular upper body clothing 4  -BB     Taking care of personal grooming (such as brushing teeth) 4  -BB     Eating meals 4  -BB     AM-PAC 6 Clicks Score (OT) 23  -BB     Row Name 10/14/22 0707          How much help from another person do you currently need...    Turning from your back to your side while in flat bed without using bedrails? 4  -CH     Moving from lying on back to sitting on the side of a flat bed without bedrails? 4  -CH     Moving to and from a bed to a chair (including a wheelchair)? 3  -CH     Standing up from a chair using your arms (e.g., wheelchair, bedside chair)? 3  -CH     Climbing 3-5 steps with a railing? 3  -CH     To walk in hospital room? 3  -CH     AM-PAC 6 Clicks Score (PT) 20  -CH     Highest level of mobility 6 --> Walked 10 steps or more  -CH           User Key  (r) = Recorded By, (t) = Taken By, (c) = Cosigned By    Initials Name Provider Type    Olga Smith COTA Occupational Therapist Assistant     Britt Tracy, RN Registered Nurse                Occupational Therapy Education     Title: PT OT SLP Therapies (In Progress)     Topic: Occupational Therapy (Done)     Point: ADL training (Done)     Description:   Instruct learner(s) on proper safety adaptation and remediation techniques during self care or transfers.   Instruct in proper use of assistive devices.              Learning Progress Summary           Patient Acceptance, E,TB, VU by ETHAN at 10/14/2022 1201    Acceptance, E,TB, VU by BB at  10/13/2022 1415    Acceptance, E,TB, VU by  at 10/12/2022 1610    Comment: OT POC, Role of OT, transfer training                   Point: Home exercise program (Done)     Description:   Instruct learner(s) on appropriate technique for monitoring, assisting and/or progressing therapeutic exercises/activities.              Learning Progress Summary           Patient Acceptance, E,TB, VU by  at 10/12/2022 1610    Comment: OT POC, Role of OT, transfer training                   Point: Precautions (Done)     Description:   Instruct learner(s) on prescribed precautions during self-care and functional transfers.              Learning Progress Summary           Patient Acceptance, E,TB, VU by  at 10/12/2022 1610    Comment: OT POC, Role of OT, transfer training                   Point: Body mechanics (Done)     Description:   Instruct learner(s) on proper positioning and spine alignment during self-care, functional mobility activities and/or exercises.              Learning Progress Summary           Patient Acceptance, E,TB, VU by  at 10/14/2022 1201    Acceptance, E,TB, VU by  at 10/13/2022 1415    Acceptance, E,TB, VU by  at 10/12/2022 1610    Comment: OT POC, Role of OT, transfer training                               User Key     Initials Effective Dates Name Provider Type Discipline     06/16/21 -  Olga Puente COTA Occupational Therapist Assistant OT     08/11/22 -  Salma Rodriguez OT Occupational Therapist OT              OT Recommendation and Plan  Therapy Frequency (OT): other (see comments) (3-7 d/wk)  Plan of Care Review  Plan of Care Reviewed With: patient  Progress: improving  Outcome Evaluation: Pt is CGA for bed<>toilet t/f with no AD. Pt performed grooming task with [place]. Pt is Independent for doffing/donning socks. Pt wants to wait on a bath to see if she is going home. Continue OT POC     Time Calculation:    Time Calculation- OT     Row Name 10/14/22 1201             Time  Calculation- OT    OT Start Time 0927  -BB      OT Stop Time 0951  -BB      OT Time Calculation (min) 24 min  -BB      Total Timed Code Minutes- OT 24 minute(s)  -BB      OT Received On 10/14/22  -BB         Timed Charges    03466 - OT Self Care/Mgmt Minutes 24  -BB         Total Minutes    Timed Charges Total Minutes 24  -BB       Total Minutes 24  -BB            User Key  (r) = Recorded By, (t) = Taken By, (c) = Cosigned By    Initials Name Provider Type    Olga Smith COTA Occupational Therapist Assistant              Therapy Charges for Today     Code Description Service Date Service Provider Modifiers Qty    58087243808 HC OT SELF CARE/MGMT/TRAIN EA 15 MIN 10/13/2022 Olga Puente COTA GO 3    22048398868 HC OT SELF CARE/MGMT/TRAIN EA 15 MIN 10/14/2022 Olga Puente COTA GO 2               ONI Cedeno  10/14/2022

## 2022-10-14 NOTE — DISCHARGE SUMMARY
[facility] Medicine Services  DISCHARGE SUMMARY       Date of Admission: 10/12/2022  Date of Discharge:  10/14/2022  Primary Care Physician: Provider, No Known    Presenting Problem/History of Present Illness:  PVC (premature ventricular contraction) [I49.3]  NSVT (nonsustained ventricular tachycardia) [I47.29]  Coronary artery disease involving native coronary artery of native heart with other form of angina pectoris (HCC) [I25.118]     Final Discharge Diagnoses:  Active Hospital Problems    Diagnosis    • **PVC (premature ventricular contraction)      Added automatically from request for surgery [id number]     • Iron deficiency anemia    • Dysphagia    • Hypertension    • Coronary artery disease involving native coronary artery of native heart with angina pectoris (HCC)    • NSVT (nonsustained ventricular tachycardia)      Added automatically from request for surgery [id number]         Consults:   Consults     Date and Time Order Name Status Description    10/12/2022  2:55 PM Inpatient Gastroenterology Consult Completed     10/12/2022  2:51 PM Inpatient Cardiology Consult Completed           Procedures Performed: Procedure(s):  Stent CONRADO coronary: LAD with/without RCA  CASE ABORT                Pertinent Test Results:   Lab Results (most recent)     Procedure Component Value Units Date/Time    Extra Tubes [[id number]] Collected: 10/14/22 0524    Specimen: Blood, Venous Line Updated: 10/14/22 0715    Narrative:      The following orders were created for panel order Extra Tubes.  Procedure                               Abnormality         Status                     ---------                               -----------         ------                     Mirella Burgos[[id number]]                                     Final result                 Please view results for these tests on the individual orders.    Mirella Burgos [[id number]] Collected: 10/14/22 0524    Specimen: Blood Updated:  10/14/22 0715     Extra Tube hold for add-on     Comment: Auto resulted       Magnesium [[id number]]  (Normal) Collected: 10/14/22 0524    Specimen: Blood Updated: 10/14/22 0614     Magnesium 2.2 mg/dL     Potassium [[id number]]  (Normal) Collected: 10/13/22 1815    Specimen: Blood Updated: 10/13/22 1837     Potassium 4.4 mmol/L      Comment: Slight hemolysis detected by analyzer. Results may be affected.       Magnesium [[id number]]  (Normal) Collected: 10/13/22 0647    Specimen: Blood Updated: 10/13/22 0942     Magnesium 1.8 mg/dL     Comprehensive Metabolic Panel [[id number]]  (Abnormal) Collected: 10/13/22 0647    Specimen: Blood Updated: 10/13/22 0752     Glucose 83 mg/dL      BUN 7 mg/dL      Creatinine 0.68 mg/dL      Sodium 136 mmol/L      Potassium 3.3 mmol/L      Chloride 103 mmol/L      CO2 25.0 mmol/L      Calcium 8.2 mg/dL      Total Protein 6.4 g/dL      Albumin 2.80 g/dL      ALT (SGPT) 14 U/L      AST (SGOT) 31 U/L      Alkaline Phosphatase 127 U/L      Total Bilirubin 0.8 mg/dL      Globulin 3.6 gm/dL      A/G Ratio 0.8 g/dL      BUN/Creatinine Ratio 10.3     Anion Gap 8.0 mmol/L      eGFR 94.4 mL/min/1.73      Comment: National Kidney Foundation and American Society of Nephrology (ASN) Task Force recommended calculation based on the Chronic Kidney Disease Epidemiology Collaboration (CKD-EPI) equation refit without adjustment for race.       Narrative:      GFR Normal >60  Chronic Kidney Disease <60  Kidney Failure <15      Iron Profile [[id number]]  (Abnormal) Collected: 10/13/22 0647    Specimen: Blood Updated: 10/13/22 0752     Iron 12 mcg/dL      Iron Saturation 3 %      Transferrin 282 mg/dL      TIBC 420 mcg/dL     Ferritin [[id number]]  (Normal) Collected: 10/13/22 0647    Specimen: Blood Updated: 10/13/22 0740     Ferritin 15.35 ng/mL     Narrative:      Results may be falsely decreased if patient taking Biotin.      TSH [[id number]]  (Normal) Collected: 10/13/22 0647    Specimen: Blood Updated:  10/13/22 0740     TSH 0.625 uIU/mL     CBC Auto Differential [[id number]]  (Abnormal) Collected: 10/13/22 0647    Specimen: Blood Updated: 10/13/22 0714     WBC 5.03 10*3/mm3      RBC 4.10 10*6/mm3      Hemoglobin 8.9 g/dL      Hematocrit 29.6 %      MCV 72.2 fL      MCH 21.7 pg      MCHC 30.1 g/dL      RDW 22.5 %      RDW-SD 56.7 fl      MPV --     Comment: Unable to calculate        Platelets 120 10*3/mm3      Neutrophil % 46.9 %      Lymphocyte % 36.0 %      Monocyte % 12.5 %      Eosinophil % 3.4 %      Basophil % 1.0 %      Immature Grans % 0.2 %      Neutrophils, Absolute 2.36 10*3/mm3      Lymphocytes, Absolute 1.81 10*3/mm3      Monocytes, Absolute 0.63 10*3/mm3      Eosinophils, Absolute 0.17 10*3/mm3      Basophils, Absolute 0.05 10*3/mm3      Immature Grans, Absolute 0.01 10*3/mm3      nRBC 0.0 /100 WBC     Basic Metabolic Panel [[id number]]  (Abnormal) Collected: 10/12/22 0916    Specimen: Blood Updated: 10/12/22 0954     Glucose 82 mg/dL      BUN 9 mg/dL      Creatinine 0.71 mg/dL      Sodium 138 mmol/L      Potassium 3.2 mmol/L      Chloride 104 mmol/L      CO2 26.0 mmol/L      Calcium 8.5 mg/dL      BUN/Creatinine Ratio 12.7     Anion Gap 8.0 mmol/L      eGFR 92.2 mL/min/1.73      Comment: National Kidney Foundation and American Society of Nephrology (ASN) Task Force recommended calculation based on the Chronic Kidney Disease Epidemiology Collaboration (CKD-EPI) equation refit without adjustment for race.       Narrative:      GFR Normal >60  Chronic Kidney Disease <60  Kidney Failure <15      CBC (No Diff) [[id number]]  (Abnormal) Collected: 10/12/22 0916    Specimen: Blood Updated: 10/12/22 0944     WBC 5.40 10*3/mm3      RBC 4.19 10*6/mm3      Hemoglobin 9.1 g/dL      Hematocrit 30.1 %      MCV 71.8 fL      MCH 21.7 pg      MCHC 30.2 g/dL      RDW 22.7 %      RDW-SD 55.8 fl      MPV --     Comment: Unable to calculate          Platelets 140 10*3/mm3     Protime-INR [[id number]]  (Abnormal) Collected:  10/12/22 0916    Specimen: Blood Updated: 10/12/22 0944     Protime 16.6 Seconds      INR 1.35    Narrative:      Therapeutic range for most indications is 2.0-3.0 INR,  or 2.5-3.5 for mechanical heart valves.        Imaging Results (Most Recent)     Procedure Component Value Units Date/Time    XR Chest 1 View [[id number]] Collected: 10/13/22 0416     Updated: 10/13/22 1026    Narrative:        PORTABLE CHEST    HISTORY: Shortness of breath. Ventricular prematurity  embolization. Ventricular tachycardia. Angina.    Portable AP upright film of the chest was obtained at 4:29 AM.  COMPARISON: None    FINDINGS:   EKG leads.  The lungs are clear of an acute process.  Old granulomatous disease is present.  Cardiomegaly.  The pulmonary vasculature is not increased.  No pleural effusion.  No pneumothorax.  No acute osseous abnormality.  Degenerative changes are present in the thoracic spine.  Atherosclerotic calcification axillary arteries.      Impression:      CONCLUSION:  Cardiomegaly.  Atherosclerotic calcification axillary arteries.    [id number]    Electronically signed by:  Dereck Ricardo MD  10/13/2022 10:24 AM  CDT Workstation: [id number]          Chief Complaint on Day of Discharge: No new concerns    Hospital Course:  The patient is a 67 y.o. female with PMH notable for Hypothryoidism, PVC's, CAD, NSVT, and RA who presented to [facility] after aborted heart cath after the patient's heart rate was noted to drop to 40 bpm after versed administration.  Patient had also noted difficulties at that time with dysphagia in regards to swallowing her potassium pills prior to procedure and shortness of breath.  Patient was admitted and GI was consulted.  GI recommended further evaluation EGD when she is cleared from cardiac standpoint.  Cardiology continues to see the patient was cleared for discharge home and close outpatient follow-up for rescheduling further evaluation in the future.  Patient will follow  "up with cardiology and GI as an outpatient.  Patient was also found to have iron deficiency anemia and was recommended to take p.o. iron tablets.  Patient noted problems with nausea and vomiting when taking these in the past but is willing to try again.  She was also given a referral to hematology to discuss possible other therapies.  She states that she had tried iron infusions in the past and noted that they gave her a seizure and states that she saw a Dr. Moore but does not remember the institution he was at.  Patient was otherwise noted to be stable for discharge and will follow up with her providers as an outpatient.    Condition on Discharge: Stable    Physical Exam on Discharge:  /60 (BP Location: Left arm, Patient Position: Lying)   Pulse 96   Temp 97.9 °F (36.6 °C) (Oral)   Resp 16   Ht 160 cm (63\")   Wt 52 kg (114 lb 11.2 oz)   SpO2 96%   BMI 20.32 kg/m²   Physical Exam  Constitutional:       General: She is not in acute distress.     Appearance: She is not toxic-appearing.   HENT:      Head: Normocephalic and atraumatic.      Right Ear: External ear normal.      Left Ear: External ear normal.      Nose: Nose normal.      Mouth/Throat:      Mouth: Mucous membranes are moist.      Pharynx: Oropharynx is clear.   Eyes:      Conjunctiva/sclera: Conjunctivae normal.   Cardiovascular:      Rate and Rhythm: Normal rate and regular rhythm.      Pulses: Normal pulses.      Heart sounds: Normal heart sounds.   Pulmonary:      Effort: Pulmonary effort is normal. No respiratory distress.      Breath sounds: Normal breath sounds.   Abdominal:      General: Bowel sounds are normal.      Palpations: Abdomen is soft.      Tenderness: There is no abdominal tenderness.   Musculoskeletal:         General: Deformity present.      Comments: Rheumatoid changes noted in her hands bilaterally   Skin:     General: Skin is warm and dry.      Capillary Refill: Capillary refill takes less than 2 seconds. "   Neurological:      General: No focal deficit present.      Mental Status: She is alert and oriented to person, place, and time. Mental status is at baseline.   Psychiatric:         Behavior: Behavior normal.         Thought Content: Thought content normal.         Discharge Disposition:  Home or Self Care    Discharge Medications:     Discharge Medications      New Medications      Instructions Start Date   ferrous sulfate 324 (65 Fe) MG tablet delayed-release EC tablet   324 mg, Oral, Daily With Breakfast      sodium chloride 0.65 % nasal spray   2 sprays, Nasal, As Needed         Continue These Medications      Instructions Start Date   aspirin 81 MG EC tablet   81 mg, Oral, Daily      atorvastatin 40 MG tablet  Commonly known as: LIPITOR   40 mg, Oral, Daily      cetirizine 10 MG tablet  Commonly known as: zyrTEC   10 mg, Oral, Daily      levothyroxine 75 MCG tablet  Commonly known as: SYNTHROID, LEVOTHROID   Take 1 tablet by mouth Daily.      MACULAR HEALTH FORMULA PO   1 tablet/day, Oral, Daily      multivitamin with minerals tablet tablet   1 tablet, Oral, Daily      methotrexate 2.5 MG tablet   methotrexate sodium 2.5 mg tablet      traMADol 50 MG tablet  Commonly known as: ULTRAM   50 mg, Oral, Every 6 Hours PRN             Discharge Diet:   Diet Instructions     Diet: Soft Texture; Thin Liquids, No Restrictions; Chopped      Discharge Diet: Soft Texture    Fluid Consistency: Thin Liquids, No Restrictions    Soft Options: Chopped          Activity at Discharge:   Activity Instructions     Activity as Tolerated            Discharge Care Plan/Instructions: Take medications as prescribed, follow-up with cardiology and GI as an outpatient, and return for worsening symptoms.    Follow-up Appointments:   Future Appointments   Date Time Provider Department Center   12/27/2022  9:45 AM Roque Carrillo MD [facility] None       Test Results Pending at Discharge:  None    Jaxon Friedman MD    Time: 29  minutes

## 2022-10-14 NOTE — NURSING NOTE
Pt temp 101.4 at d/c vitals.  Tylenol given.  Dr. Friedman made aware, orders to proceed with D/C.

## 2022-10-14 NOTE — DISCHARGE INSTR - LAB
Follow up appointment made with Traci MENDIOLA on NOV 14 at 09:00        Follow up appointment with Dr. Carrillo is OCT 28 @ 09:15

## 2022-10-17 ENCOUNTER — PREP FOR SURGERY (OUTPATIENT)
Dept: OTHER | Facility: HOSPITAL | Age: 67
End: 2022-10-17

## 2022-10-17 DIAGNOSIS — I47.29 NSVT (NONSUSTAINED VENTRICULAR TACHYCARDIA): ICD-10-CM

## 2022-10-17 DIAGNOSIS — I25.118 CORONARY ARTERY DISEASE INVOLVING NATIVE CORONARY ARTERY OF NATIVE HEART WITH OTHER FORM OF ANGINA PECTORIS: ICD-10-CM

## 2022-10-17 DIAGNOSIS — I49.3 PVC (PREMATURE VENTRICULAR CONTRACTION): Primary | ICD-10-CM

## 2022-10-17 RX ORDER — SODIUM CHLORIDE 0.9 % (FLUSH) 0.9 %
3 SYRINGE (ML) INJECTION EVERY 12 HOURS SCHEDULED
Status: CANCELLED | OUTPATIENT
Start: 2022-10-20

## 2022-10-17 RX ORDER — SODIUM CHLORIDE 9 MG/ML
50 INJECTION, SOLUTION INTRAVENOUS ONCE
Status: CANCELLED | OUTPATIENT
Start: 2022-10-20 | End: 2022-10-17

## 2022-10-17 RX ORDER — ASPIRIN 81 MG/1
81 TABLET ORAL DAILY
Status: CANCELLED | OUTPATIENT
Start: 2022-10-20

## 2022-10-17 RX ORDER — SODIUM CHLORIDE 0.9 % (FLUSH) 0.9 %
10 SYRINGE (ML) INJECTION AS NEEDED
Status: CANCELLED | OUTPATIENT
Start: 2022-10-20

## 2022-10-17 RX ORDER — ASPIRIN 325 MG
325 TABLET ORAL ONCE
Status: CANCELLED | OUTPATIENT
Start: 2022-10-20 | End: 2022-10-17

## 2022-10-20 ENCOUNTER — ANESTHESIA EVENT (OUTPATIENT)
Dept: CARDIOLOGY | Facility: HOSPITAL | Age: 67
End: 2022-10-20

## 2022-10-20 ENCOUNTER — ANESTHESIA (OUTPATIENT)
Dept: CARDIOLOGY | Facility: HOSPITAL | Age: 67
End: 2022-10-20

## 2022-10-20 ENCOUNTER — HOSPITAL ENCOUNTER (OUTPATIENT)
Facility: HOSPITAL | Age: 67
Discharge: HOME | End: 2022-10-21
Attending: INTERNAL MEDICINE | Admitting: INTERNAL MEDICINE

## 2022-10-20 DIAGNOSIS — I25.118 CORONARY ARTERY DISEASE INVOLVING NATIVE CORONARY ARTERY OF NATIVE HEART WITH OTHER FORM OF ANGINA PECTORIS: ICD-10-CM

## 2022-10-20 DIAGNOSIS — I49.3 PVC (PREMATURE VENTRICULAR CONTRACTION): ICD-10-CM

## 2022-10-20 DIAGNOSIS — I47.29 NSVT (NONSUSTAINED VENTRICULAR TACHYCARDIA): ICD-10-CM

## 2022-10-20 DIAGNOSIS — R13.19 OTHER DYSPHAGIA: Primary | ICD-10-CM

## 2022-10-20 PROBLEM — Z98.61 CAD S/P PERCUTANEOUS CORONARY ANGIOPLASTY: Status: ACTIVE | Noted: 2022-10-20

## 2022-10-20 PROBLEM — I25.10 CAD S/P PERCUTANEOUS CORONARY ANGIOPLASTY: Status: ACTIVE | Noted: 2022-10-20

## 2022-10-20 LAB
ANION GAP SERPL CALCULATED.3IONS-SCNC: 8 MMOL/L (ref 5–15)
BUN SERPL-MCNC: 11 MG/DL (ref 8–23)
BUN/CREAT SERPL: 15.7 (ref 7–25)
CALCIUM SPEC-SCNC: 8.1 MG/DL (ref 8.6–10.5)
CHLORIDE SERPL-SCNC: 105 MMOL/L (ref 98–107)
CO2 SERPL-SCNC: 25 MMOL/L (ref 22–29)
CREAT SERPL-MCNC: 0.7 MG/DL (ref 0.57–1)
DEPRECATED RDW RBC AUTO: 58 FL (ref 37–54)
EGFRCR SERPLBLD CKD-EPI 2021: 93.8 ML/MIN/1.73
ERYTHROCYTE [DISTWIDTH] IN BLOOD BY AUTOMATED COUNT: 23 % (ref 12.3–15.4)
GLUCOSE SERPL-MCNC: 96 MG/DL (ref 65–99)
HCT VFR BLD AUTO: 30.2 % (ref 34–46.6)
HGB BLD-MCNC: 9.3 G/DL (ref 12–15.9)
INR PPP: 1.48 (ref 0.8–1.2)
MCH RBC QN AUTO: 22.1 PG (ref 26.6–33)
MCHC RBC AUTO-ENTMCNC: 30.8 G/DL (ref 31.5–35.7)
MCV RBC AUTO: 71.7 FL (ref 79–97)
OXYGEN SATURATION, PULMONARY ARTERY: 96.2 %
OXYGEN SATURATION, RIGHT ATRIUM: 93.2 % (ref 94–100)
OXYGEN SATURATION, RIGHT VENTRICLE: 94.9 % (ref 94–100)
PLATELET # BLD AUTO: 173 10*3/MM3 (ref 140–450)
PMV BLD AUTO: ABNORMAL FL
POTASSIUM SERPL-SCNC: 3.6 MMOL/L (ref 3.5–5.2)
PROTHROMBIN TIME: 17.8 SECONDS (ref 11.1–15.3)
RBC # BLD AUTO: 4.21 10*6/MM3 (ref 3.77–5.28)
SAO2 % BLDA: >100 %
SODIUM SERPL-SCNC: 138 MMOL/L (ref 136–145)
WBC NRBC COR # BLD: 5.64 10*3/MM3 (ref 3.4–10.8)

## 2022-10-20 PROCEDURE — G0378 HOSPITAL OBSERVATION PER HR: HCPCS

## 2022-10-20 PROCEDURE — 92933 PRQ TRLML C ATHRC ST ANGIOP1: CPT | Performed by: INTERNAL MEDICINE

## 2022-10-20 PROCEDURE — C1769 GUIDE WIRE: HCPCS | Performed by: INTERNAL MEDICINE

## 2022-10-20 PROCEDURE — 25010000002 ONDANSETRON PER 1 MG: Performed by: NURSE ANESTHETIST, CERTIFIED REGISTERED

## 2022-10-20 PROCEDURE — 82810 BLOOD GASES O2 SAT ONLY: CPT | Performed by: INTERNAL MEDICINE

## 2022-10-20 PROCEDURE — C1760 CLOSURE DEV, VASC: HCPCS | Performed by: INTERNAL MEDICINE

## 2022-10-20 PROCEDURE — 85610 PROTHROMBIN TIME: CPT | Performed by: INTERNAL MEDICINE

## 2022-10-20 PROCEDURE — C1887 CATHETER, GUIDING: HCPCS | Performed by: INTERNAL MEDICINE

## 2022-10-20 PROCEDURE — 25010000002 MIDAZOLAM PER 1 MG: Performed by: NURSE ANESTHETIST, CERTIFIED REGISTERED

## 2022-10-20 PROCEDURE — 93453 R&L HRT CATH W/VENTRICLGRPHY: CPT | Performed by: INTERNAL MEDICINE

## 2022-10-20 PROCEDURE — C1894 INTRO/SHEATH, NON-LASER: HCPCS | Performed by: INTERNAL MEDICINE

## 2022-10-20 PROCEDURE — 80048 BASIC METABOLIC PNL TOTAL CA: CPT | Performed by: INTERNAL MEDICINE

## 2022-10-20 PROCEDURE — 93005 ELECTROCARDIOGRAM TRACING: CPT | Performed by: INTERNAL MEDICINE

## 2022-10-20 PROCEDURE — 25010000002 SUCCINYLCHOLINE PER 20 MG: Performed by: NURSE ANESTHETIST, CERTIFIED REGISTERED

## 2022-10-20 PROCEDURE — 25010000002 FENTANYL CITRATE (PF) 50 MCG/ML SOLUTION: Performed by: NURSE ANESTHETIST, CERTIFIED REGISTERED

## 2022-10-20 PROCEDURE — 99214 OFFICE O/P EST MOD 30 MIN: CPT | Performed by: INTERNAL MEDICINE

## 2022-10-20 PROCEDURE — C1874 STENT, COATED/COV W/DEL SYS: HCPCS | Performed by: INTERNAL MEDICINE

## 2022-10-20 PROCEDURE — 25010000002 HEPARIN (PORCINE) PER 1000 UNITS: Performed by: INTERNAL MEDICINE

## 2022-10-20 PROCEDURE — 0 IOPAMIDOL PER 1 ML: Performed by: INTERNAL MEDICINE

## 2022-10-20 PROCEDURE — 92978 ENDOLUMINL IVUS OCT C 1ST: CPT | Performed by: INTERNAL MEDICINE

## 2022-10-20 PROCEDURE — 93010 ELECTROCARDIOGRAM REPORT: CPT | Performed by: INTERNAL MEDICINE

## 2022-10-20 PROCEDURE — C1724 CATH, TRANS ATHEREC,ROTATION: HCPCS | Performed by: INTERNAL MEDICINE

## 2022-10-20 PROCEDURE — 85027 COMPLETE CBC AUTOMATED: CPT | Performed by: INTERNAL MEDICINE

## 2022-10-20 PROCEDURE — C1753 CATH, INTRAVAS ULTRASOUND: HCPCS | Performed by: INTERNAL MEDICINE

## 2022-10-20 PROCEDURE — C1725 CATH, TRANSLUMIN NON-LASER: HCPCS | Performed by: INTERNAL MEDICINE

## 2022-10-20 PROCEDURE — C9602 PERC D-E COR STENT ATHER S: HCPCS | Performed by: INTERNAL MEDICINE

## 2022-10-20 PROCEDURE — 25010000002 PROPOFOL 10 MG/ML EMULSION: Performed by: NURSE ANESTHETIST, CERTIFIED REGISTERED

## 2022-10-20 DEVICE — XIENCE SKYPOINT™ EVEROLIMUS ELUTING CORONARY STENT SYSTEM 2.50 MM X 28 MM / RAPID-EXCHANGE
Type: IMPLANTABLE DEVICE | Status: FUNCTIONAL
Brand: XIENCE SKYPOINT™

## 2022-10-20 RX ORDER — ASPIRIN 325 MG
325 TABLET ORAL ONCE
Status: DISCONTINUED | OUTPATIENT
Start: 2022-10-20 | End: 2022-10-20

## 2022-10-20 RX ORDER — ACETAMINOPHEN 325 MG/1
650 TABLET ORAL EVERY 4 HOURS PRN
Status: DISCONTINUED | OUTPATIENT
Start: 2022-10-20 | End: 2022-10-21 | Stop reason: HOSPADM

## 2022-10-20 RX ORDER — HEPARIN SODIUM 1000 [USP'U]/ML
INJECTION, SOLUTION INTRAVENOUS; SUBCUTANEOUS
Status: DISCONTINUED | OUTPATIENT
Start: 2022-10-20 | End: 2022-10-20 | Stop reason: HOSPADM

## 2022-10-20 RX ORDER — ONDANSETRON 2 MG/ML
INJECTION INTRAMUSCULAR; INTRAVENOUS AS NEEDED
Status: DISCONTINUED | OUTPATIENT
Start: 2022-10-20 | End: 2022-10-20 | Stop reason: SURG

## 2022-10-20 RX ORDER — SODIUM CHLORIDE 9 MG/ML
100 INJECTION, SOLUTION INTRAVENOUS CONTINUOUS
Status: ACTIVE | OUTPATIENT
Start: 2022-10-20 | End: 2022-10-21

## 2022-10-20 RX ORDER — SODIUM CHLORIDE 0.9 % (FLUSH) 0.9 %
10 SYRINGE (ML) INJECTION AS NEEDED
Status: DISCONTINUED | OUTPATIENT
Start: 2022-10-20 | End: 2022-10-20

## 2022-10-20 RX ORDER — LIDOCAINE HYDROCHLORIDE 20 MG/ML
INJECTION, SOLUTION INFILTRATION; PERINEURAL AS NEEDED
Status: DISCONTINUED | OUTPATIENT
Start: 2022-10-20 | End: 2022-10-20 | Stop reason: SURG

## 2022-10-20 RX ORDER — SODIUM CHLORIDE 9 MG/ML
50 INJECTION, SOLUTION INTRAVENOUS ONCE
Status: COMPLETED | OUTPATIENT
Start: 2022-10-20 | End: 2022-10-20

## 2022-10-20 RX ORDER — ASPIRIN 81 MG/1
81 TABLET ORAL DAILY
Status: DISCONTINUED | OUTPATIENT
Start: 2022-10-21 | End: 2022-10-20

## 2022-10-20 RX ORDER — SODIUM CHLORIDE 0.9 % (FLUSH) 0.9 %
3 SYRINGE (ML) INJECTION EVERY 12 HOURS SCHEDULED
Status: DISCONTINUED | OUTPATIENT
Start: 2022-10-20 | End: 2022-10-20

## 2022-10-20 RX ORDER — CLOPIDOGREL BISULFATE 75 MG/1
75 TABLET ORAL DAILY
Status: DISCONTINUED | OUTPATIENT
Start: 2022-10-21 | End: 2022-10-21 | Stop reason: HOSPADM

## 2022-10-20 RX ORDER — LIDOCAINE HYDROCHLORIDE 20 MG/ML
INJECTION, SOLUTION INFILTRATION; PERINEURAL
Status: DISCONTINUED | OUTPATIENT
Start: 2022-10-20 | End: 2022-10-20 | Stop reason: HOSPADM

## 2022-10-20 RX ORDER — EPHEDRINE SULFATE 50 MG/ML
INJECTION INTRAVENOUS AS NEEDED
Status: DISCONTINUED | OUTPATIENT
Start: 2022-10-20 | End: 2022-10-20 | Stop reason: SURG

## 2022-10-20 RX ORDER — MIDAZOLAM HYDROCHLORIDE 1 MG/ML
INJECTION INTRAMUSCULAR; INTRAVENOUS AS NEEDED
Status: DISCONTINUED | OUTPATIENT
Start: 2022-10-20 | End: 2022-10-20 | Stop reason: SURG

## 2022-10-20 RX ORDER — CLOPIDOGREL BISULFATE 75 MG/1
TABLET ORAL
Status: DISCONTINUED | OUTPATIENT
Start: 2022-10-20 | End: 2022-10-20 | Stop reason: HOSPADM

## 2022-10-20 RX ORDER — DEXTROSE AND SODIUM CHLORIDE 5; .45 G/100ML; G/100ML
30 INJECTION, SOLUTION INTRAVENOUS CONTINUOUS PRN
Status: CANCELLED | OUTPATIENT
Start: 2022-10-20

## 2022-10-20 RX ORDER — ATORVASTATIN CALCIUM 40 MG/1
40 TABLET, FILM COATED ORAL DAILY
Status: DISCONTINUED | OUTPATIENT
Start: 2022-10-20 | End: 2022-10-21 | Stop reason: HOSPADM

## 2022-10-20 RX ORDER — NITROGLYCERIN 5 MG/ML
INJECTION, SOLUTION INTRAVENOUS
Status: DISCONTINUED | OUTPATIENT
Start: 2022-10-20 | End: 2022-10-20 | Stop reason: HOSPADM

## 2022-10-20 RX ORDER — FENTANYL CITRATE 50 UG/ML
INJECTION, SOLUTION INTRAMUSCULAR; INTRAVENOUS AS NEEDED
Status: DISCONTINUED | OUTPATIENT
Start: 2022-10-20 | End: 2022-10-20 | Stop reason: SURG

## 2022-10-20 RX ORDER — ASPIRIN 81 MG/1
81 TABLET, CHEWABLE ORAL ONCE
Status: COMPLETED | OUTPATIENT
Start: 2022-10-20 | End: 2022-10-20

## 2022-10-20 RX ORDER — MULTIPLE VITAMINS W/ MINERALS TAB 9MG-400MCG
1 TAB ORAL DAILY
Status: DISCONTINUED | OUTPATIENT
Start: 2022-10-20 | End: 2022-10-21 | Stop reason: HOSPADM

## 2022-10-20 RX ORDER — FERROUS SULFATE TAB EC 324 MG (65 MG FE EQUIVALENT) 324 (65 FE) MG
324 TABLET DELAYED RESPONSE ORAL
Status: DISCONTINUED | OUTPATIENT
Start: 2022-10-20 | End: 2022-10-21 | Stop reason: HOSPADM

## 2022-10-20 RX ORDER — MEPERIDINE HYDROCHLORIDE 25 MG/ML
12.5 INJECTION INTRAMUSCULAR; INTRAVENOUS; SUBCUTANEOUS
Status: DISCONTINUED | OUTPATIENT
Start: 2022-10-20 | End: 2022-10-20 | Stop reason: HOSPADM

## 2022-10-20 RX ORDER — SODIUM CHLORIDE 9 MG/ML
INJECTION, SOLUTION INTRAVENOUS CONTINUOUS PRN
Status: DISCONTINUED | OUTPATIENT
Start: 2022-10-20 | End: 2022-10-20 | Stop reason: SURG

## 2022-10-20 RX ORDER — CLOPIDOGREL BISULFATE 75 MG/1
600 TABLET ORAL ONCE
Status: COMPLETED | OUTPATIENT
Start: 2022-10-20 | End: 2022-10-20

## 2022-10-20 RX ORDER — PROPOFOL 10 MG/ML
VIAL (ML) INTRAVENOUS AS NEEDED
Status: DISCONTINUED | OUTPATIENT
Start: 2022-10-20 | End: 2022-10-20 | Stop reason: SURG

## 2022-10-20 RX ORDER — SUCCINYLCHOLINE CHLORIDE 20 MG/ML
INJECTION INTRAMUSCULAR; INTRAVENOUS AS NEEDED
Status: DISCONTINUED | OUTPATIENT
Start: 2022-10-20 | End: 2022-10-20 | Stop reason: SURG

## 2022-10-20 RX ORDER — LEVOTHYROXINE SODIUM 0.07 MG/1
75 TABLET ORAL DAILY
Status: DISCONTINUED | OUTPATIENT
Start: 2022-10-20 | End: 2022-10-21 | Stop reason: HOSPADM

## 2022-10-20 RX ORDER — ASPIRIN 81 MG/1
81 TABLET ORAL DAILY
Status: DISCONTINUED | OUTPATIENT
Start: 2022-10-21 | End: 2022-10-21 | Stop reason: HOSPADM

## 2022-10-20 RX ORDER — TRAMADOL HYDROCHLORIDE 50 MG/1
50 TABLET ORAL EVERY 6 HOURS PRN
Status: DISCONTINUED | OUTPATIENT
Start: 2022-10-20 | End: 2022-10-21 | Stop reason: HOSPADM

## 2022-10-20 RX ADMIN — LIDOCAINE HYDROCHLORIDE 80 MG: 20 INJECTION, SOLUTION INFILTRATION; PERINEURAL at 10:43

## 2022-10-20 RX ADMIN — ASPIRIN 81 MG: 81 TABLET, CHEWABLE ORAL at 07:02

## 2022-10-20 RX ADMIN — CLOPIDOGREL BISULFATE 75 MG: 75 TABLET ORAL at 08:25

## 2022-10-20 RX ADMIN — PROPOFOL 40 MG: 10 INJECTION, EMULSION INTRAVENOUS at 09:26

## 2022-10-20 RX ADMIN — SUCCINYLCHOLINE CHLORIDE 100 MG: 20 INJECTION, SOLUTION INTRAMUSCULAR; INTRAVENOUS at 08:41

## 2022-10-20 RX ADMIN — FENTANYL CITRATE 50 MCG: 50 INJECTION INTRAMUSCULAR; INTRAVENOUS at 08:37

## 2022-10-20 RX ADMIN — ONDANSETRON 4 MG: 2 INJECTION INTRAMUSCULAR; INTRAVENOUS at 10:22

## 2022-10-20 RX ADMIN — MIDAZOLAM HYDROCHLORIDE 2 MG: 1 INJECTION, SOLUTION INTRAMUSCULAR; INTRAVENOUS at 08:32

## 2022-10-20 RX ADMIN — PROPOFOL 50 MG: 10 INJECTION, EMULSION INTRAVENOUS at 08:41

## 2022-10-20 RX ADMIN — EPHEDRINE SULFATE 10 MG: 50 INJECTION INTRAVENOUS at 08:50

## 2022-10-20 RX ADMIN — SODIUM CHLORIDE: 9 INJECTION, SOLUTION INTRAVENOUS at 08:31

## 2022-10-20 RX ADMIN — SODIUM CHLORIDE 50 ML/HR: 9 INJECTION, SOLUTION INTRAVENOUS at 06:58

## 2022-10-20 RX ADMIN — EPHEDRINE SULFATE 10 MG: 50 INJECTION INTRAVENOUS at 08:59

## 2022-10-20 NOTE — INTERVAL H&P NOTE
Chief complaint: Shortness of breath    H&P reviewed. The patient was examined and there are no changes to the H&P.      [facility] Pre-Op:    Invasive coronary angiography was recommended to the patient.  The patientdenies  bleeding issues. The patient reports use of antiplatelet agents.  The patient denies  CKD. The patient denies  contrast allergy. The patient denies  use of diabetes medications.    Pre-Cath Surgical History: No prior history of CABG or PCI    The risks (bleeding, infection, heart attack, stroke, kidney failure, need for urgent open heart bypass surgery, death), benefits (definitive diagnosis and possible treatment) and alternatives (medical therapy) of this procedure were discussed with the patient in detail today and she agreed to proceed after understanding these.    The indications, risks/benefits and alternatives of diagnostic left heart cardiac catheterization, angiography, conscious sedation, and possible blood transfusion were discussed in detail with the patient. The potential complications of 1/2000 chance of death, 1/1000 chance of heart attack or stroke, 1/500 chance of bleeding or clotting of the femoral artery, and 1/500 chance of allergic reaction to contrast were discussed. We also reviewed possible complications of infection and kidney dysfunction. If PCI were performed and intra-coronary stents indicated, we discussed the details about CONRADO. This included a review of the risks of the infrequent, but relatively higher incidence of late thrombosis with CONRADO. The importance of maintaining a consistent daily regimen of aspirin and an additional anti-platelet agent for as long as directed after implantation was emphasized. No contraindications were found. The patient  appeared to understand and agreed to the above.  -Right heart catheterization, Left heart catheterization, Coronary angiography, In-situ LIMA angiography, Left ventriculography, Intravascular ultrasound, Optical coherence  tomography, Flow wire, Balloon Angioplasty, Coronary stent, Graft stent, Iliofemoral angiography, Device closure, femoral artery, Intra-aortic balloon pump, Impella LV assist device implantation, Transvenous pacemaker, and Pericardiocentesis, orbital atherectomy.    Additional risks of orbital atherectomy have been explained to the patient during previous visits for the procedure.    ASA Class: 3  Mallampati Score: 2    Contraindications to DAPT: None

## 2022-10-20 NOTE — CONSULTS
SUBJECTIVE:   10/20/2022    Name: Umer Frazier  DOD: 1955    REASON FOR CONSULT: Dysphagia    Chief Complaint:   No chief complaint on file.      Subjective     Patient is 67 y.o. female presents with complaint of dysphagia.  Patient states that when she swallows her pills feels as though they are getting stuck when she swallows them.  Patient denies any other problem.      ROS/HISTORY/ CURRENT MEDICATIONS/OBJECTIVE/VS/PE:   Review of Systems:   Review of Systems    History:     Past Medical History:   Diagnosis Date   • Arthritis    • Hypothyroidism    • Murmur, heart      Past Surgical History:   Procedure Laterality Date   • CARDIAC CATHETERIZATION N/A 2022    Procedure: Left and Right Heart Cath;  Surgeon: Roque Carrillo MD;  Location: [facility] CATH INVASIVE LOCATION;  Service: Cardiology;  Laterality: N/A;   • CARDIAC CATHETERIZATION N/A 10/12/2022    Procedure: Stent CONRADO coronary: LAD with/without RCA;  Surgeon: Roque Carrillo MD;  Location: [facility] CATH INVASIVE LOCATION;  Service: Cardiology;  Laterality: N/A;   • CARDIAC CATHETERIZATION  10/12/2022    Procedure: CASE ABORT;  Surgeon: Roque Carrillo MD;  Location: [facility] CATH INVASIVE LOCATION;  Service: Cardiology;;  patient condition   • CYST REMOVAL     • FEMUR SURGERY       Family History   Family history unknown: Yes     Social History     Tobacco Use   • Smoking status: Former     Types: Cigarettes     Quit date:      Years since quittin.8   • Smokeless tobacco: Never   Vaping Use   • Vaping Use: Never used   Substance Use Topics   • Alcohol use: Yes     Comment: ON OCC.   • Drug use: Never     Medications Prior to Admission   Medication Sig Dispense Refill Last Dose   • aspirin 81 MG EC tablet Take 1 tablet by mouth Daily. 90 tablet 1 10/19/2022 at 0800   • atorvastatin (LIPITOR) 40 MG tablet Take 1 tablet by mouth Daily. 90 tablet 1 10/19/2022 at 2200   • cetirizine (zyrTEC) 10 MG tablet Take 10 mg by mouth Daily.    10/19/2022 at 0800   • ferrous sulfate 324 (65 Fe) MG tablet delayed-release EC tablet Take 1 tablet by mouth Daily With Breakfast. 30 tablet 0 10/19/2022 at 0800   • levothyroxine (SYNTHROID, LEVOTHROID) 75 MCG tablet Take 1 tablet by mouth Daily.   10/20/2022 at 0430   • sodium chloride 0.65 % nasal spray 2 sprays into the nostril(s) as directed by provider As Needed for Congestion.  12 10/19/2022 at 0800   • traMADol (ULTRAM) 50 MG tablet Take 1 tablet by mouth Every 6 (Six) Hours As Needed for Moderate Pain or Severe Pain. 40 tablet 0 10/19/2022 at 2200   • methotrexate 2.5 MG tablet Takes every Thursday   10/13/2022   • Multiple Vitamins-Minerals (MACULAR HEALTH FORMULA PO) Take 1 tablet/day by mouth Daily.   10/18/2022 at 0800   • multivitamin with minerals tablet tablet Take 1 tablet by mouth Daily.   10/18/2022 at 0800     Allergies:  Folic acid and Nickel    I have reviewed the patient's medical history, surgical history and family history in the available medical record system.     Current Medications:     Current Facility-Administered Medications   Medication Dose Route Frequency Provider Last Rate Last Admin   • acetaminophen (TYLENOL) tablet 650 mg  650 mg Oral Q4H PRN Roque Carrillo MD       • [START ON 10/21/2022] aspirin EC tablet 81 mg  81 mg Oral Daily Roque Carrillo MD       • atorvastatin (LIPITOR) tablet 40 mg  40 mg Oral Daily Roque Carrillo MD       • [START ON 10/21/2022] clopidogrel (PLAVIX) tablet 75 mg  75 mg Oral Daily Roque Carrillo MD       • ferrous sulfate EC tablet 324 mg  324 mg Oral Daily With Breakfast Roque Carrillo MD       • levothyroxine (SYNTHROID, LEVOTHROID) tablet 75 mcg  75 mcg Oral Daily Roque Carrillo MD       • multivitamin with minerals 1 tablet  1 tablet Oral Daily Roque Carrillo MD       • traMADol (ULTRAM) tablet 50 mg  50 mg Oral Q6H PRN Roque Carrillo MD           Objective     Physical Exam:   Temp:  [97.4 °F  (36.3 °C)-97.6 °F (36.4 °C)] 97.6 °F (36.4 °C)  Heart Rate:  [70-89] 79  Resp:  [14-18] 18  BP: (146-175)/(71-87) 168/81    Physical Exam:  General Appearance:    Alert, cooperative, in no acute distress   Head:    Normocephalic, without obvious abnormality, atraumatic   Eyes:            Lids and lashes normal, conjunctivae and sclerae normal, no   icterus, no pallor, corneas clear, PERRLA   Ears:    Ears appear intact with no abnormalities noted   Throat:   No oral lesions, no thrush, oral mucosa moist   Neck:   No adenopathy, supple, trachea midline, no thyromegaly, no     carotid bruit, no JVD   Back:     No kyphosis present, no scoliosis present, no skin lesions,       erythema or scars, no tenderness to percussion or                   palpation,   range of motion normal   Lungs:     Clear to auscultation,respirations regular, even and                   unlabored    Heart:    Regular rhythm and normal rate, normal S1 and S2, no            murmur, no gallop, no rub, no click   Breast Exam:    Deferred   Abdomen:     Normal bowel sounds, no masses, no organomegaly, soft        non-tender, non-distended, no guarding, no rebound                 tenderness   Genitalia:    Deferred   Extremities:   Moves all extremities well, no edema, no cyanosis, no              redness   Pulses:   Pulses palpable and equal bilaterally   Skin:   No bleeding, bruising or rash   Lymph nodes:   No palpable adenopathy   Neurologic:   Cranial nerves 2 - 12 grossly intact, sensation intact, DTR        present and equal bilaterally      Results Review:     Lab Results   Component Value Date    WBC 5.64 10/20/2022    WBC 5.03 10/13/2022    WBC 5.40 10/12/2022    HGB 9.3 (L) 10/20/2022    HGB 8.9 (L) 10/13/2022    HGB 9.1 (L) 10/12/2022    HCT 30.2 (L) 10/20/2022    HCT 29.6 (L) 10/13/2022    HCT 30.1 (L) 10/12/2022     10/20/2022     (L) 10/13/2022     10/12/2022             No results found for: LIPASE  Lab Results    Component Value Date    INR 1.48 (H) 10/20/2022    INR 1.35 (H) 10/12/2022    INR 1.32 (H) 09/08/2022         Radiology Review:  Imaging Results (Last 72 Hours)     ** No results found for the last 72 hours. **          I reviewed the patient's new clinical results.    I reviewed the patient's new imaging results and agree with the interpretation.     ASSESSMENT/PLAN:   ASSESSMENT: Patient with dysphagia.  Patient was recently admitted to the hospital for cardiac work-up but states she is having difficulty swallowing her pills.  Patient is currently on Plavix for her coronary artery disease but probably will be on this for a long period    PLAN: #1 we will schedule patient for EGD with possible dilatation if stricture is seen  The risks, benefits, and alternatives of this procedure have been discussed with the patient or the responsible party. The patient understands and agrees to proceed.         Luke Aragon MD  10/20/22  17:47 CDT     This document has been electronically signed by Luke Aragon MD on October 20, 2022 17:47 CDT

## 2022-10-20 NOTE — NURSING NOTE
Dr. Carrillo ordered for patient to have plavix 600mg prior to procedure. Patient was asked if she was able to take pills and she stated that she could one at a time. This RN provided patient water and administered one tablet of plavix. Patient immediately became choked on pill. Suction at bedside. Patient was actively coughing and was sat on the side of the bed. O2 sats were monitored and remained at 98% on room air. Dr. Carrillo and SERAFIN Munguia were notified and both came to assess patient. Patient did not stop breathing and was able to speak to staff. After a couple of minutes patient stated that the pill was down and she was okay. She also stated that this happens all the time at home. Patient was stable during entire episode. Dr. Carrillo re-assessed patient prior to proceeding to procedure room. All further doses of plavix will be held until after procedure is completed. This RN gave remaining pills to ELIJAH Connelly to administer after procedure.

## 2022-10-20 NOTE — ANESTHESIA PREPROCEDURE EVALUATION
Anesthesia Evaluation     Patient summary reviewed and Nursing notes reviewed   no history of anesthetic complications:  NPO Solid Status: > 8 hours  NPO Liquid Status: > 2 hours           Airway   Mallampati: II  TM distance: >3 FB  Neck ROM: full  possible difficult intubation  Dental    (+) poor dentation    Comment: Remaining upper and lower dentition in hopeless repair.    Pulmonary     breath sounds clear to auscultation  (+) a smoker Former,   (-) shortness of breath  Cardiovascular     ECG reviewed  Rhythm: regular  Rate: normal    (+) hypertension, valvular problems/murmurs murmur, AS and MR, CAD, dysrhythmias PVC, Tachycardia, angina, LAMBERT, murmur (Grade III/VI systolic),     ROS comment: Sinus rhythm with Premature atrial complexes in a pattern of bigeminy  Nonspecific ST and T wave abnormality  Abnormal ECG  When compared with ECG of 17-MAY-2022 11:18,  Premature ventricular complexes are no longer Present     Referred By:            Confirmed By: ROQUE CARRILLOConclusion :  Severe two vessel CAD involving mid LAD and distal RCA.  PCI to mid LAD had to be aborted during the procedure due to patient confusion and inability to lie still on the table. No balloon inflations were performed.     Plan:   Continue antiplatelet therapy with aspirin. Initiate statin therapy.  Optimize medical therapy for CAD risk factors.  Routine post cath care instructions (no driving for 48 hrs, no lifting >10 lbs for 7 days) were discussed with the patient and family.  Observation in same day care unit. Discharge later today if mental status improves.   Outpatient follow up in 2 weeks to discuss further steps.        This document has been electronically signed by Roque Carrillo MD on September 8, 2022 10:15 CDT      Calculated left ventricular 3D EF = 55% Estimated left ventricular EF = 55% Left ventricular ejection fraction appears to be 51 - 55%. Left ventricular systolic function is normal.   Left ventricular wall  thickness is consistent with mild concentric hypertrophy.   Left atrial volume is mildly increased.   Moderate aortic valve stenosis is present. Vmax 3.2 m/s, mean gradient 21 mm Hg.   Mild to moderate mitral valve regurgitation is present.      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                   Anesthesia Plan    ASA 4     general and MAC   total IV anesthesia  intravenous induction     Anesthetic plan, risks, benefits, and alternatives have been provided, discussed and informed consent has been obtained with: patient.  Pre-procedure education provided  Plan discussed with CRNA.        CODE STATUS:

## 2022-10-20 NOTE — ANESTHESIA POSTPROCEDURE EVALUATION
Patient: Umer Frazier    Procedure Summary     Date: 10/20/22 Room / Location: [facility] CATH LAB  / [facility] CATH INVASIVE LOCATION    Anesthesia Start: 0832 Anesthesia Stop: 1058    Procedure: Left Heart Cath (Groin) Diagnosis:       PVC (premature ventricular contraction)      NSVT (nonsustained ventricular tachycardia)      Coronary artery disease involving native coronary artery of native heart with other form of angina pectoris (HCC)      (CAD)    Providers: Roque Carrillo MD Provider: Elias Andrew CRNA    Anesthesia Type: general, MAC ASA Status: 4          Anesthesia Type: general, MAC    Vitals  Vitals Value Taken Time   /74 10/20/22 1058   Temp 97.6 °F (36.4 °C) 10/20/22 1058   Pulse 88 10/20/22 1058   Resp 14 10/20/22 1058   SpO2 98 % 10/20/22 1058           Post Anesthesia Care and Evaluation    Patient location during evaluation: PACU  Patient participation: waiting for patient participation  Level of consciousness: responsive to verbal stimuli  Pain management: adequate    Airway patency: patent  Anesthetic complications: No anesthetic complications    Cardiovascular status: acceptable  Respiratory status: acceptable  Hydration status: acceptable    Comments: ---------------------------               10/20/22                      1058         ---------------------------   BP:          155/74         Pulse:         88           Resp:          14           Temp:   97.6 °F (36.4 °C)   SpO2:          98%         ---------------------------

## 2022-10-20 NOTE — ANESTHESIA PROCEDURE NOTES
Airway  Urgency: elective    Airway not difficult    General Information and Staff    Patient location during procedure: OR  CRNA/CAA: Elias Andrew CRNA    Indications and Patient Condition  Indications for airway management: airway protection    Preoxygenated: yes  MILS maintained throughout  Mask difficulty assessment: 1 - vent by mask    Final Airway Details  Final airway type: endotracheal airway      Successful airway: ETT  Cuffed: yes   Successful intubation technique: direct laryngoscopy  Endotracheal tube insertion site: oral  Blade: Esperanza  Blade size: 3  ETT size (mm): 6.5  Cormack-Lehane Classification: grade IIa - partial view of glottis  Placement verified by: chest auscultation and capnometry   Cuff volume (mL): 7  Measured from: lips  ETT/EBT  to lips (cm): 20  Number of attempts at approach: 1  Assessment: lips, teeth, and gum same as pre-op and atraumatic intubation

## 2022-10-20 NOTE — POST-PROCEDURE NOTE
Pre-Op Diagnosis:  Coronary artery disease  Post-Op Diagnosis:  Coronary artery disease  Procedure:  Left heart catheterization, right heart catheterization, left coronary angiography, orbital atherectomy/IVUS/PCI of the LAD  Anesthesia: Local.  Patient was electively intubated for the procedure by anesthesia.  EBL: Minimal   Specimens:  None  Findings:  Severe CAD involving the LAD system  Complications: None  Disposition:  Patient tolerated the procedure well    This document has been electronically signed by Roque Carrillo MD on October 20, 2022 10:50 CDT

## 2022-10-21 ENCOUNTER — ANESTHESIA (OUTPATIENT)
Dept: GASTROENTEROLOGY | Facility: HOSPITAL | Age: 67
End: 2022-10-21

## 2022-10-21 ENCOUNTER — ANESTHESIA EVENT (OUTPATIENT)
Dept: GASTROENTEROLOGY | Facility: HOSPITAL | Age: 67
End: 2022-10-21

## 2022-10-21 VITALS
BODY MASS INDEX: 20.23 KG/M2 | TEMPERATURE: 96.2 F | RESPIRATION RATE: 18 BRPM | HEART RATE: 73 BPM | WEIGHT: 114.2 LBS | OXYGEN SATURATION: 98 % | DIASTOLIC BLOOD PRESSURE: 70 MMHG | SYSTOLIC BLOOD PRESSURE: 157 MMHG | HEIGHT: 63 IN

## 2022-10-21 LAB
ANION GAP SERPL CALCULATED.3IONS-SCNC: 8 MMOL/L (ref 5–15)
BUN SERPL-MCNC: 9 MG/DL (ref 8–23)
BUN/CREAT SERPL: 12.5 (ref 7–25)
CALCIUM SPEC-SCNC: 7.7 MG/DL (ref 8.6–10.5)
CHLORIDE SERPL-SCNC: 107 MMOL/L (ref 98–107)
CO2 SERPL-SCNC: 25 MMOL/L (ref 22–29)
CREAT SERPL-MCNC: 0.72 MG/DL (ref 0.57–1)
DEPRECATED RDW RBC AUTO: 56.7 FL (ref 37–54)
EGFRCR SERPLBLD CKD-EPI 2021: 90.6 ML/MIN/1.73
ERYTHROCYTE [DISTWIDTH] IN BLOOD BY AUTOMATED COUNT: 22.6 % (ref 12.3–15.4)
GLUCOSE SERPL-MCNC: 80 MG/DL (ref 65–99)
HCT VFR BLD AUTO: 25.8 % (ref 34–46.6)
HGB BLD-MCNC: 8.1 G/DL (ref 12–15.9)
MCH RBC QN AUTO: 22.4 PG (ref 26.6–33)
MCHC RBC AUTO-ENTMCNC: 31.4 G/DL (ref 31.5–35.7)
MCV RBC AUTO: 71.3 FL (ref 79–97)
PLATELET # BLD AUTO: 150 10*3/MM3 (ref 140–450)
PMV BLD AUTO: ABNORMAL FL
POTASSIUM SERPL-SCNC: 3.4 MMOL/L (ref 3.5–5.2)
RBC # BLD AUTO: 3.62 10*6/MM3 (ref 3.77–5.28)
SODIUM SERPL-SCNC: 140 MMOL/L (ref 136–145)
WBC NRBC COR # BLD: 5.57 10*3/MM3 (ref 3.4–10.8)

## 2022-10-21 PROCEDURE — 25010000002 PROPOFOL 10 MG/ML EMULSION: Performed by: NURSE ANESTHETIST, CERTIFIED REGISTERED

## 2022-10-21 PROCEDURE — G0378 HOSPITAL OBSERVATION PER HR: HCPCS

## 2022-10-21 PROCEDURE — 0 POTASSIUM CHLORIDE 10 MEQ/100ML SOLUTION: Performed by: INTERNAL MEDICINE

## 2022-10-21 PROCEDURE — 99217 PR OBSERVATION CARE DISCHARGE MANAGEMENT: CPT | Performed by: INTERNAL MEDICINE

## 2022-10-21 PROCEDURE — 85027 COMPLETE CBC AUTOMATED: CPT | Performed by: INTERNAL MEDICINE

## 2022-10-21 PROCEDURE — 43249 ESOPH EGD DILATION <30 MM: CPT | Performed by: INTERNAL MEDICINE

## 2022-10-21 PROCEDURE — C1726 CATH, BAL DIL, NON-VASCULAR: HCPCS | Performed by: INTERNAL MEDICINE

## 2022-10-21 PROCEDURE — 80048 BASIC METABOLIC PNL TOTAL CA: CPT | Performed by: INTERNAL MEDICINE

## 2022-10-21 PROCEDURE — 99213 OFFICE O/P EST LOW 20 MIN: CPT | Performed by: INTERNAL MEDICINE

## 2022-10-21 RX ORDER — PROPOFOL 10 MG/ML
VIAL (ML) INTRAVENOUS AS NEEDED
Status: DISCONTINUED | OUTPATIENT
Start: 2022-10-21 | End: 2022-10-21 | Stop reason: SURG

## 2022-10-21 RX ORDER — POTASSIUM CHLORIDE 1.5 G/1.77G
40 POWDER, FOR SOLUTION ORAL AS NEEDED
Status: DISCONTINUED | OUTPATIENT
Start: 2022-10-21 | End: 2022-10-21 | Stop reason: HOSPADM

## 2022-10-21 RX ORDER — POTASSIUM CHLORIDE 750 MG/1
40 CAPSULE, EXTENDED RELEASE ORAL AS NEEDED
Status: DISCONTINUED | OUTPATIENT
Start: 2022-10-21 | End: 2022-10-21 | Stop reason: HOSPADM

## 2022-10-21 RX ORDER — CLOPIDOGREL BISULFATE 75 MG/1
75 TABLET ORAL DAILY
Qty: 90 TABLET | Refills: 3 | Status: SHIPPED | OUTPATIENT
Start: 2022-10-22

## 2022-10-21 RX ORDER — LIDOCAINE HYDROCHLORIDE 20 MG/ML
INJECTION, SOLUTION EPIDURAL; INFILTRATION; INTRACAUDAL; PERINEURAL AS NEEDED
Status: DISCONTINUED | OUTPATIENT
Start: 2022-10-21 | End: 2022-10-21 | Stop reason: SURG

## 2022-10-21 RX ORDER — POTASSIUM CHLORIDE 7.45 MG/ML
10 INJECTION INTRAVENOUS
Status: DISCONTINUED | OUTPATIENT
Start: 2022-10-21 | End: 2022-10-21 | Stop reason: HOSPADM

## 2022-10-21 RX ADMIN — POTASSIUM CHLORIDE 10 MEQ: 7.46 INJECTION, SOLUTION INTRAVENOUS at 13:59

## 2022-10-21 RX ADMIN — Medication 1 TABLET: at 08:29

## 2022-10-21 RX ADMIN — SODIUM CHLORIDE: 9 INJECTION, SOLUTION INTRAVENOUS at 11:19

## 2022-10-21 RX ADMIN — ATORVASTATIN CALCIUM 40 MG: 40 TABLET, FILM COATED ORAL at 08:29

## 2022-10-21 RX ADMIN — LEVOTHYROXINE SODIUM 75 MCG: 75 TABLET ORAL at 08:29

## 2022-10-21 RX ADMIN — POTASSIUM CHLORIDE 10 MEQ: 7.46 INJECTION, SOLUTION INTRAVENOUS at 15:06

## 2022-10-21 RX ADMIN — PROPOFOL 50 MG: 10 INJECTION, EMULSION INTRAVENOUS at 11:33

## 2022-10-21 RX ADMIN — FERROUS SULFATE TAB EC 324 MG (65 MG FE EQUIVALENT) 324 MG: 324 (65 FE) TABLET DELAYED RESPONSE at 08:29

## 2022-10-21 RX ADMIN — POTASSIUM CHLORIDE 10 MEQ: 7.46 INJECTION, SOLUTION INTRAVENOUS at 12:44

## 2022-10-21 RX ADMIN — ASPIRIN 81 MG: 81 TABLET, FILM COATED ORAL at 08:29

## 2022-10-21 RX ADMIN — LIDOCAINE HYDROCHLORIDE 50 MG: 20 INJECTION, SOLUTION EPIDURAL; INFILTRATION; INTRACAUDAL; PERINEURAL at 11:33

## 2022-10-21 RX ADMIN — PROPOFOL 20 MG: 10 INJECTION, EMULSION INTRAVENOUS at 11:37

## 2022-10-21 RX ADMIN — POTASSIUM CHLORIDE 10 MEQ: 7.46 INJECTION, SOLUTION INTRAVENOUS at 16:25

## 2022-10-21 RX ADMIN — CLOPIDOGREL BISULFATE 75 MG: 75 TABLET ORAL at 08:29

## 2022-10-21 RX ADMIN — PROPOFOL 20 MG: 10 INJECTION, EMULSION INTRAVENOUS at 11:35

## 2022-10-21 NOTE — PLAN OF CARE
Goal Outcome Evaluation:  Plan of Care Reviewed With: patient        Progress: no change  Outcome Evaluation: stent placed yesterday in heart cath, R  groin cath site WNL, EGD today with Mahesh

## 2022-10-21 NOTE — PROGRESS NOTES
SUBJECTIVE:   10/21/2022  Chief Complaint:     Subjective      Patient is 67 y.o. female with dysphagia.  Patient had EGD with dilatation to 36 English.  This should enlarge the esophagus enough to take medications but she will still have some dysphagia.  Patient will need repeat EGDs with dilatation but this must be done slowly secondary to patient needing anticoagulation at this time    History:  Past Medical History:   Diagnosis Date   • Arthritis    • Hypothyroidism    • Murmur, heart      Past Surgical History:   Procedure Laterality Date   • CARDIAC CATHETERIZATION N/A 2022    Procedure: Left and Right Heart Cath;  Surgeon: Roque Carrillo MD;  Location: [facility] CATH INVASIVE LOCATION;  Service: Cardiology;  Laterality: N/A;   • CARDIAC CATHETERIZATION N/A 10/12/2022    Procedure: Stent CONRADO coronary: LAD with/without RCA;  Surgeon: Roque Carrillo MD;  Location: [facility] CATH INVASIVE LOCATION;  Service: Cardiology;  Laterality: N/A;   • CARDIAC CATHETERIZATION  10/12/2022    Procedure: CASE ABORT;  Surgeon: Roque Carrillo MD;  Location: [facility] CATH INVASIVE LOCATION;  Service: Cardiology;;  patient condition   • CARDIAC CATHETERIZATION N/A 10/20/2022    Procedure: Left Heart Cath;  Surgeon: Roque Carrillo MD;  Location: [facility] CATH INVASIVE LOCATION;  Service: Cardiology;  Laterality: N/A;   • CYST REMOVAL     • FEMUR SURGERY       Family History   Family history unknown: Yes     Social History     Tobacco Use   • Smoking status: Former     Types: Cigarettes     Quit date:      Years since quittin.8   • Smokeless tobacco: Never   Vaping Use   • Vaping Use: Never used   Substance Use Topics   • Alcohol use: Yes     Comment: ON OCC.   • Drug use: Never     Medications Prior to Admission   Medication Sig Dispense Refill Last Dose   • aspirin 81 MG EC tablet Take 1 tablet by mouth Daily. 90 tablet 1 10/19/2022 at 0800   • atorvastatin (LIPITOR) 40 MG tablet Take 1 tablet by  mouth Daily. 90 tablet 1 10/19/2022 at 2200   • cetirizine (zyrTEC) 10 MG tablet Take 10 mg by mouth Daily.   10/19/2022 at 0800   • ferrous sulfate 324 (65 Fe) MG tablet delayed-release EC tablet Take 1 tablet by mouth Daily With Breakfast. 30 tablet 0 10/19/2022 at 0800   • levothyroxine (SYNTHROID, LEVOTHROID) 75 MCG tablet Take 1 tablet by mouth Daily.   10/20/2022 at 0430   • sodium chloride 0.65 % nasal spray 2 sprays into the nostril(s) as directed by provider As Needed for Congestion.  12 10/19/2022 at 0800   • traMADol (ULTRAM) 50 MG tablet Take 1 tablet by mouth Every 6 (Six) Hours As Needed for Moderate Pain or Severe Pain. 40 tablet 0 10/19/2022 at 2200   • methotrexate 2.5 MG tablet Takes every Thursday   10/13/2022   • Multiple Vitamins-Minerals (MACULAR HEALTH FORMULA PO) Take 1 tablet/day by mouth Daily.   10/18/2022 at 0800   • multivitamin with minerals tablet tablet Take 1 tablet by mouth Daily.   10/18/2022 at 0800     Allergies:  Folic acid and Nickel     CURRENT MEDICATIONS/OBJECTIVE/VS/PE:     Current Medications:     Current Facility-Administered Medications   Medication Dose Route Frequency Provider Last Rate Last Admin   • acetaminophen (TYLENOL) tablet 650 mg  650 mg Oral Q4H PRN Roque Carrillo MD       • aspirin EC tablet 81 mg  81 mg Oral Daily Roque Carrillo MD   81 mg at 10/21/22 0829   • atorvastatin (LIPITOR) tablet 40 mg  40 mg Oral Daily Roque Carrillo MD   40 mg at 10/21/22 0829   • clopidogrel (PLAVIX) tablet 75 mg  75 mg Oral Daily Roque Carrillo MD   75 mg at 10/21/22 0829   • ferrous sulfate EC tablet 324 mg  324 mg Oral Daily With Breakfast Roque Carrillo MD   324 mg at 10/21/22 0829   • levothyroxine (SYNTHROID, LEVOTHROID) tablet 75 mcg  75 mcg Oral Daily Roque Carrillo MD   75 mcg at 10/21/22 0829   • multivitamin with minerals 1 tablet  1 tablet Oral Daily Roque Carrillo MD   1 tablet at 10/21/22 0829   • potassium chloride  (MICRO-K) CR capsule 40 mEq  40 mEq Oral PRN Roque Carrillo MD        Or   • potassium chloride (KLOR-CON) packet 40 mEq  40 mEq Oral PRN Roque Carrillo MD        Or   • potassium chloride 10 mEq in 100 mL IVPB  10 mEq Intravenous Q1H PRN Roque Carrillo MD       • sodium chloride 0.9 % infusion  100 mL/hr Intravenous Continuous Roque Carrillo MD   New Bag at 10/21/22 1119   • traMADol (ULTRAM) tablet 50 mg  50 mg Oral Q6H PRN Roque Carrillo MD           Objective     Review of Systems    Physical Exam:   Temp:  [97.4 °F (36.3 °C)-98.3 °F (36.8 °C)] 97.4 °F (36.3 °C)  Heart Rate:  [] 64  Resp:  [16-18] 18  BP: (106-174)/(53-87) 146/66     Physical Exam:  General Appearance:    Alert, cooperative, in no acute distress   Head:    Normocephalic, without obvious abnormality, atraumatic   Eyes:            Lids and lashes normal, conjunctivae and sclerae normal, no icterus, no pallor, corneas clear, PERRLA   Ears:    Ears appear intact with no abnormalities noted   Throat:   No oral lesions, no thrush, oral mucosa moist   Neck:   No adenopathy, supple, trachea midline, no thyromegaly, no carotid bruit, no JVD   Back:     No kyphosis present, no scoliosis present, no skin lesions,  erythema or scars, no tenderness to percussion or                  palpation, range of motion normal   Lungs:     Clear to auscultation,respirations regular, even and  unlabored    Heart:    Regular rhythm and normal rate, normal S1 and S2, no        murmur, no gallop, no rub, no click   Breast Exam:    Deferred   Abdomen:     Normal bowel sounds, no masses, no organomegaly, soft,    nontender, nondistended, no guarding, no rebound                 tenderness   Genitalia:    Deferred   Extremities:   Moves all extremities well, no edema, no cyanosis, no          redness   Pulses:   Pulses palpable and equal bilaterally   Skin:   No bleeding, bruising or rash   Lymph nodes:   No palpable adenopathy   Neurologic:    Cranial nerves 2 - 12 grossly intact, sensation intact, DTR     present and equal bilaterally      Results Review:     Lab Results (last 24 hours)     Procedure Component Value Units Date/Time    Basic Metabolic Panel [[id number]]  (Abnormal) Collected: 10/21/22 0544    Specimen: Blood Updated: 10/21/22 0724     Glucose 80 mg/dL      BUN 9 mg/dL      Creatinine 0.72 mg/dL      Sodium 140 mmol/L      Potassium 3.4 mmol/L      Chloride 107 mmol/L      CO2 25.0 mmol/L      Calcium 7.7 mg/dL      BUN/Creatinine Ratio 12.5     Anion Gap 8.0 mmol/L      eGFR 90.6 mL/min/1.73      Comment: National Kidney Foundation and American Society of Nephrology (ASN) Task Force recommended calculation based on the Chronic Kidney Disease Epidemiology Collaboration (CKD-EPI) equation refit without adjustment for race.       Narrative:      GFR Normal >60  Chronic Kidney Disease <60  Kidney Failure <15      CBC (No Diff) [[id number]]  (Abnormal) Collected: 10/21/22 0544    Specimen: Blood Updated: 10/21/22 0711     WBC 5.57 10*3/mm3      RBC 3.62 10*6/mm3      Hemoglobin 8.1 g/dL      Hematocrit 25.8 %      MCV 71.3 fL      MCH 22.4 pg      MCHC 31.4 g/dL      RDW 22.6 %      RDW-SD 56.7 fl      MPV --     Comment: Instrument unable to calculate        Platelets 150 10*3/mm3           I reviewed the patient's new clinical results.  I reviewed the patient's new imaging results and agree with the interpretation.     ASSESSMENT/PLAN:   ASSESSMENT: Patient with dysphagia.  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  This was dilated to 36 Slov[place].  Further dilatation was not possible secondary to patient being on blood thinners this should improve patient swallowing some patient will need future dilatations to improve her symptoms complete    PLAN: #1 from GI standpoint patient be discharged home  #2 patient should follow-up in GI within 1 week.  Patient will need progressive dilations of this esophageal stricture  The risks, benefits, and  alternatives of this procedure have been discussed with the patient or the responsible party- the patient understands and agrees to proceed.         Luke Aragon MD  10/21/22  11:44 CDT           This document has been electronically signed by Luke Aragon MD on October 21, 2022 11:44 CDT

## 2022-10-21 NOTE — ANESTHESIA POSTPROCEDURE EVALUATION
Patient: Umer Frazier    Procedure Summary     Date: 10/21/22 Room / Location: [facility] ENDOSCOPY 1 / [facility] ENDOSCOPY    Anesthesia Start: 1130 Anesthesia Stop: 1139    Procedure: ESOPHAGOGASTRODUODENOSCOPY Diagnosis:       Other dysphagia      (Other dysphagia [R13.19])    Surgeons: Luke Aragon MD Provider: La Purcell CRNA    Anesthesia Type: general ASA Status: 4          Anesthesia Type: general    Vitals  No vitals data found for the desired time range.          Post Anesthesia Care and Evaluation    Patient location during evaluation: bedside  Patient participation: complete - patient participated  Level of consciousness: awake and awake and alert  Pain score: 0  Pain management: adequate    Airway patency: patent  Anesthetic complications: No anesthetic complications  PONV Status: none  Cardiovascular status: acceptable and stable  Respiratory status: acceptable, room air and spontaneous ventilation  Hydration status: acceptable    Comments: BP:  126/61  HR:  84  SAT:  100  RR:  16  TEMP: 98.3

## 2022-10-21 NOTE — ANESTHESIA PREPROCEDURE EVALUATION
Anesthesia Evaluation     Patient summary reviewed and Nursing notes reviewed   no history of anesthetic complications:  NPO Solid Status: > 8 hours  NPO Liquid Status: > 2 hours           Airway   Mallampati: II  TM distance: >3 FB  Neck ROM: full  possible difficult intubation  Dental    (+) poor dentation    Comment: Remaining upper and lower dentition in hopeless repair.    Pulmonary     breath sounds clear to auscultation  (+) a smoker Former,   (-) shortness of breath  Cardiovascular     ECG reviewed  Rhythm: regular  Rate: normal    (+) hypertension, valvular problems/murmurs murmur, AS and MR, CAD, cardiac stents (placed 10/20/22) within the past 12 months dysrhythmias PVC, Tachycardia, angina, LAMBERT, murmur (Grade III/VI systolic),     ROS comment: Sinus rhythm with Premature atrial complexes in a pattern of bigeminy  Nonspecific ST and T wave abnormality  Abnormal ECG  When compared with ECG of 17-MAY-2022 11:18,  Premature ventricular complexes are no longer Present     Referred By:            Confirmed By: ROQUE CARRILLOConclusion :  Severe two vessel CAD involving mid LAD and distal RCA.  PCI to mid LAD had to be aborted during the procedure due to patient confusion and inability to lie still on the table. No balloon inflations were performed.     Plan:   Continue antiplatelet therapy with aspirin. Initiate statin therapy.  Optimize medical therapy for CAD risk factors.  Routine post cath care instructions (no driving for 48 hrs, no lifting >10 lbs for 7 days) were discussed with the patient and family.  Observation in same day care unit. Discharge later today if mental status improves.   Outpatient follow up in 2 weeks to discuss further steps.        This document has been electronically signed by Roque Carrillo MD on September 8, 2022 10:15 CDT      Calculated left ventricular 3D EF = 55% Estimated left ventricular EF = 55% Left ventricular ejection fraction appears to be 51 - 55%. Left ventricular  systolic function is normal.   Left ventricular wall thickness is consistent with mild concentric hypertrophy.   Left atrial volume is mildly increased.   Moderate aortic valve stenosis is present. Vmax 3.2 m/s, mean gradient 21 mm Hg.   Mild to moderate mitral valve regurgitation is present.      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                   Anesthesia Plan    ASA 4     general   total IV anesthesia  intravenous induction     Anesthetic plan, risks, benefits, and alternatives have been provided, discussed and informed consent has been obtained with: patient.  Pre-procedure education provided  Plan discussed with CRNA.        CODE STATUS:

## 2022-10-21 NOTE — DISCHARGE SUMMARY
[facility]  [address] [place] [address]. [id number]  T - [phone]     DISCHARGE SUMMARY         PATIENT  DEMOGRAPHICS   PATIENT NAME: Atilioxuan Frazier                      PHYSICIAN: Dr. Carrillo  : 1955  MRN: [id number]    ADMISSION/DISCHARGE INFO   ADMISSION DATE: 10/20/2022     DISCHARGE DATE: 10/21/22    ADMISSION DIAGNOSES:   PVC (premature ventricular contraction) [I49.3]  NSVT (nonsustained ventricular tachycardia) [I47.29]  Coronary artery disease involving native coronary artery of native heart with other form of angina pectoris (HCC) [I25.118]    DISCHARGE DIAGNOSES:   1. Other dysphagia    2. PVC (premature ventricular contraction)    3. NSVT (nonsustained ventricular tachycardia)    4. Coronary artery disease involving native coronary artery of native heart with other form of angina pectoris (HCC)        PVC (premature ventricular contraction)    NSVT (nonsustained ventricular tachycardia)    Coronary artery disease involving native coronary artery of native heart with angina pectoris (HCC)    Dysphagia    CAD S/P percutaneous coronary angioplasty      ATTENDING PROVIDER:   Dr. HAILE Carrillo     CONSULTS   Consult Orders (all) (From admission, onward)     Start     Ordered    10/20/22 1921  Inpatient Gastroenterology Consult  Once        Specialty:  Gastroenterology  Provider:  Luke Aragon MD    10/20/22 1920    10/20/22 1144  Cardiac Rehab Evaluation and Enrollment  Once        Provider:  (Not yet assigned)    10/20/22 1143               Consults     Date and Time Order Name Status Description    10/12/2022  2:55 PM Inpatient Gastroenterology Consult Completed     10/12/2022  2:51 PM Inpatient Cardiology Consult Completed           PROCEDURES     ECG/EMG Results (last 24 hours)     ** No results found for the last 24 hours. **          Results for orders placed in visit on 22    Adult Transthoracic Echo Complete W/ Cont if Necessary Per  Protocol    Interpretation Summary  · Calculated left ventricular 3D EF = 55% Estimated left ventricular EF = 55% Left ventricular ejection fraction appears to be 51 - 55%. Left ventricular systolic function is normal.  · Left ventricular wall thickness is consistent with mild concentric hypertrophy.  · Left atrial volume is mildly increased.  · Moderate aortic valve stenosis is present. Vmax 3.2 m/s, mean gradient 21 mm Hg.  · Mild to moderate mitral valve regurgitation is present.      XR Chest 1 View    Result Date: 10/13/2022  CONCLUSION: Cardiomegaly. Atherosclerotic calcification axillary arteries. [id number] Electronically signed by:  Dereck Ricardo MD  10/13/2022 10:24 AM CDT Workstation: [id number]      Cardiac Catheterization/Vascular Study    Result Date: 10/20/2022  Narrative: Images from the original result were not included. [facility] Cardiology CARDIAC CATHETERIZATION NOTE Date of procedure: 10/20/2022  Procedures performed:  1. Selective left coronary angiography 2. Left heart catheterization 3. Right heart catheterization 4. Orbital atherectomy of the LAD 5. Intravascular ultrasound (IVUS) evaluation of the LAD 6. Percutaneous coronary intervention (PCI) to the LAD 7. Selective right iliofemoral angiography 8. Ultrasound guided vascular access  Indication: Staged PCI; left and right heart catheterization could not be completed at the time of diagnostic procedure Access site: Right common femoral artery; right femoral vein Catheters: 6FR JL4, 6F [place]-Cande Guiding catheter: 6F XB LAD 3.5 Procedural details: Informed consent was obtained from the patient after explaining risks, benefits and alternatives of the procedure. The patient was brought to the cath lab and prepped and draped in the sterile fashion. Timeout was performed. Lidocaine was used for local anesthesia. Access was obtained in the access: Right common femoral artery using modified Seldinger technique and a  6Fr sheath  was placed over the wire. Catheter exchanges were made over a 0.035 inch guidewire under fluoroscopic guidance.  A 6F XB LAD 3.5 guide catheter was used to engage the Left main. Multiple cineographic images were taken in orthogonal views.  This catheter was used for orbital atherectomy, intravascular ultrasound and percutaneous coronary intervention to the LAD.  6 F JR 4 catheter was used to cross the aortic valve. Filling pressures were recorded.  Simultaneous LV-Ao pressures were recorded (with femoral artery pressure as surrogate for Ao pressure) and pull back was performed. The catheter was then removed over the guidewire. The sheath was then removed and arterial hemostasis was obtained using Angio-seal device. There were no obvious complications and the patient was transferred to the recovery unit in hemodynamically stable condition. Right heart catheterization: The patient was brought to the cath lab and prepped and draped in the usual fashion. Modified Seldinger technique was used to place a 6 Fr sheath in the right femoral vein under ultrasound guidance. A 6F Pearson-Cande catheter was passed through the sheath under fluoroscopic guidance into the right atrium.  Pressure measurements were obtained progressively in the RA, RV, PA and PCWP positions with catheter advancement under fluoroscopic guidance.  Subsequently, oxygen saturation samples were obtained from PA, RV and RA while the catheter was being withdrawn back from its position in the pulmonary artery.  After obtaining the above-mentioned oxygen saturation samples, the balloon was deflated and Pearson-Cande catheter was withdrawn out under fluoroscopic guidance from the sheath.  Hemostasis was obtained using Perclose device. Findings: Hemodynamics:  Ao 117/51 mmHg, /-4 mmHg, LVEDP 7 mmHg Left ventriculography: Not performed. Selective coronary angiography: Dominance: Right  Left Main coronary artery: Angiographically calcified. No significant  angiographic stenosis is noted in the left main. It divides into LAD and LCx.  Left anterior descending artery: It reaches the apex. Mild-moderate angiographic calcification is noted in the proximal LAD.  Ostial-proximal LAD has a discrete 30% stenosis. Mid LAD has severe calcific diffuse disease (causing up to 80-90% stenosis in the tightest segment) between the take off of second and fourth diagonal branches.  This was a target for PCI today. First diagonal: Small caliber vessel. No significant angiographic disease is noted. Second diagonal: Small caliber vessel. No significant angiographic disease is noted. Third diagonal: Small vessel. Fourth diagonal: Medium sized vessel. No significant angiographic disease is noted.  Left circumflex:  Large vessel. Angiographically calcified. Mid LCx exhibits mild diffuse disease causing stenosis of up to 40%. Areas of ectasia are noted in mid LCx as well. OM1: Small caliber vessel. Angiographically normal. OM2: Medium-large sized vessel. No hemodynamically significant angiographic disease is noted. FINDINGS: Body surface area: 1.52 m²  Ao pressure: 117/51 mm Hg    Right heart pressures: RA: 3/2/1 mm Hg  RV: 27/0/4 mm Hg  PA: 25/8 mm Hg  Mean PAP: 17 mm Hg PCWP: 13/20/12 mm Hg  Medications: 1. 2% Lidocaine Procedure Logistics: Fluoro: 21.8 minutes Right iliofemoral angiography: Visualized portion of right common iliac, right external iliac, right common femoral, right superficial femoral and right profunda femoris arteries appear patent Orbital atherectomy, intravascular ultrasound (IVUS) and percutaneous coronary intervention: A 6F XB LAD 3.5 guide catheter was used to engage the ostium of the left main coronary artery. IV heparin was used for anticoagulation. A 0.014 in GW Viper coronary guidewire was used to successfully cross the lesion in the mid LAD.  Multiple runs of orbital atherectomy were then performed with a 1.25 mm diamondback 360 catheter into the mid LAD lesion.   Pre-dilation was then performed with a 2.5 x 15 mm NC balloon; this balloon expanded well in the lesion. This was followed by successful placement of a 2.5 x 28 mm Xience Skypoint CONRADO into the lesion.  We subsequently attempted to pass an IVUS catheter into the stented segment to evaluate stent expansion, proximal edge dissection and distal stent edge dissection.  There was no evidence of proximal stent edge dissection.  IVUS catheter could not be passed beyond the midportion of the stent.  The proximal portion of the stent appeared undersized for the vessel.  Post dilation was performed with 3.0 x 15 mm followed by  3.5 x 12 mm NC balloons into the proximal and middle portions of the stent. There were no obvious complications on subsequent angiographic images.  Mild-moderate angiographic CAD was noted distal to the stent; this was medically managed. The GW The Museer coronary guidewire was then removed from the LAD. Final angiographic images did not show any obvious complications; FABIAN III flow was noted in the LAD system. Therapeutic ACT was documented at the end of the procedure. Complications: None Specimen Removed: None Estimated Blood Loss: Minimal  Conclusion : Successful PCI to mid LAD with CONRADO placement.  Orbital atherectomy was used for lesion modification.  Plan: Continue dual antiplatelet therapy with aspirin and plavix.  Loading dose of Plavix was administered in the Cath Lab. Optimize medical therapy for CAD and CAD risk factors. Routine post cath care instructions (no driving for 48 hrs, no lifting >10 lbs for 7 days) were discussed with the patient and family. IV fluid hydration.  Overnight observation. Consider gastroenterology consultation to evaluate for dysphagia. This document has been electronically signed by Roque Carrillo MD on October 20, 2022 15:44 CDT   Part of this note may be an electronic transcription/translation of spoken language to printed text using the Dragon Dictation System.      Cardiac Catheterization/Vascular Study    Result Date: 10/12/2022  Narrative: The patient presented for elective percutaneous coronary intervention to the LAD with orbital atherectomy today.  Anesthesia assistance was requested to help with sedation management. In the preoperative area, the patient had some difficulty swallowing oral potassium tablets.  She reported having intermittent difficulty swallowing at home and also noted that sometimes she feels like swallowed stuff is going into her lungs.  The case was reviewed with anesthesia staff and decision was made to proceed with elective intubation/mechanical ventilatory support in order to allow PCI safely.  The patient was taken to the Cath Lab table; she was administered Versed in preparation for intubation.  Her heart rate went down to 40 bpm range from this.  Decision was made to abort the PCI at that point and admit her to the hospitalist service for further evaluation of dysphagia and telemetry monitoring.  Her heart rate recovered to normal limits shortly thereafter.  She remained hemodynamically stable during the procedure.     XR Chest 1 View    Result Date: 10/13/2022  Narrative: PORTABLE CHEST HISTORY: Shortness of breath. Ventricular prematurity embolization. Ventricular tachycardia. Angina. Portable AP upright film of the chest was obtained at 4:29 AM. COMPARISON: None FINDINGS: EKG leads. The lungs are clear of an acute process. Old granulomatous disease is present. Cardiomegaly. The pulmonary vasculature is not increased. No pleural effusion. No pneumothorax. No acute osseous abnormality. Degenerative changes are present in the thoracic spine. Atherosclerotic calcification axillary arteries.     Impression: CONCLUSION: Cardiomegaly. Atherosclerotic calcification axillary arteries. [id number] Electronically signed by:  Dereck Ricardo MD  10/13/2022 10:24 AM CDT Workstation: [id number]    Aborted Cath Cath    Result Date: 10/12/2022  Narrative: Aborted  Case      HISTORY OF PRESENT ILLNESS   Mrs. Umer Frazier is a 67 year old female with medical history of coronary artery disease, rheumatoid arthritis, hypothyroidism, PVC, and nonsustained ventricular tachycardia..  On 9/8/2022, she had a left heart catheterization that found severe two-vessel CAD involving the mid LAD and distal RCA.  Proceeded to fix the LAD lesion.  Patient was unable to lie still and procedure was aborted.  Medical management was recommended at that time.  However, she was taken back to the Cath Lab on 10/12/2022 for elective PCI and unable to swallow potassium chloride tablets.  Anesthesia assisted with planned sedation and elective intubation.  However the Versed made her bradycardic and the procedure was aborted.  A consult was placed to GI to evaluate her dysphagia.  She was sent home to follow-up for elective PCI    On 10/20/2022, patient was brought in electively for coronary angiogram, left and right heart catheterization.  PCI to the LAD with orbital arthrectomy, IVUS..  Anesthesia assisted with electively intubation for procedure.  DIAGNOSTIC DATA     Procedure(s):  ESOPHAGOGASTRODUODENOSCOPY               Invalid input(s): TERELL FINE    Estimated Creatinine Clearance: 60.3 mL/min (by C-G formula based on SCr of 0.72 mg/dL).                      No results found for: CKTOTAL, CKMB, CKMBINDEX, TROPONINI, TROPONINT  No results found for: PROBNP    No intake/output data recorded.      HOSPITAL COURSE   Patient is a 67 y.o. female presented for elective PCI procedure.  Previously attempt had to be aborted due to patient unable to sleep swallow pills and bradycardia with Versed.    Today, Dr. Carrillo femoral approach performed successful PCI to mid LAD with CONRADO placement.  Orbital atherectomy was used.  IVUS was used.  Patient had no complications overnight.  Right femoral artery site without evidence of bleeding or hematoma.  No paresthesia.  She is denying chest pain and feels well  enough to go home.     DISCHARGE CONDITION   Condition on Discharge: Stable    Vital Signs       Physical Exam:  Physical Exam  Vitals and nursing note reviewed.   Constitutional:       Appearance: Normal appearance. She is well-developed, well-groomed and normal weight. She is not ill-appearing or diaphoretic.   HENT:      Head: Normocephalic and atraumatic.      Nose: Nose normal.      Mouth/Throat:      Mouth: Mucous membranes are moist.      Pharynx: Oropharynx is clear. No oropharyngeal exudate.   Eyes:      General:         Right eye: No discharge.         Left eye: No discharge.      Conjunctiva/sclera: Conjunctivae normal.      Pupils: Pupils are equal, round, and reactive to light.   Neck:      Vascular: No JVD.      Trachea: Trachea normal.   Cardiovascular:      Rate and Rhythm: Normal rate and regular rhythm.      Pulses: Normal pulses.      Heart sounds: Normal heart sounds, S1 normal and S2 normal. No murmur heard.  Pulmonary:      Effort: Pulmonary effort is normal.      Breath sounds: Normal breath sounds and air entry. No decreased air movement. No wheezing.   Abdominal:      General: Abdomen is flat. Bowel sounds are normal.      Tenderness: There is no abdominal tenderness.   Musculoskeletal:      Right lower leg: No edema.      Left lower leg: No edema.   Skin:     General: Skin is warm and dry.      Capillary Refill: Capillary refill takes less than 2 seconds.      Coloration: Skin is not pale.   Neurological:      Mental Status: She is alert and oriented to person, place, and time.      Gait: Gait is intact.   Psychiatric:         Attention and Perception: Attention normal.         Mood and Affect: Mood normal.         Speech: Speech normal.         Behavior: Behavior normal. Behavior is cooperative.         DISPOSITION   To Home      DISCHARGE MEDICATIONS        Your medication list      START taking these medications      Instructions Last Dose Given Next Dose Due   clopidogrel 75 MG  tablet  Commonly known as: PLAVIX  Start taking on: October 22, 2022      Take 1 tablet by mouth Daily.          CONTINUE taking these medications      Instructions Last Dose Given Next Dose Due   aspirin 81 MG EC tablet      Take 1 tablet by mouth Daily.       atorvastatin 40 MG tablet  Commonly known as: LIPITOR      Take 1 tablet by mouth Daily.       cetirizine 10 MG tablet  Commonly known as: zyrTEC      Take 10 mg by mouth Daily.       ferrous sulfate 324 (65 Fe) MG tablet delayed-release EC tablet      Take 1 tablet by mouth Daily With Breakfast.       levothyroxine 75 MCG tablet  Commonly known as: SYNTHROID, LEVOTHROID      Take 1 tablet by mouth Daily.       MACULAR HEALTH FORMULA PO      Take 1 tablet/day by mouth Daily.       multivitamin with minerals tablet tablet      Take 1 tablet by mouth Daily.       methotrexate 2.5 MG tablet  Notes to patient: Take as directed      Takes every Thursday       sodium chloride 0.65 % nasal spray      2 sprays into the nostril(s) as directed by provider As Needed for Congestion.       traMADol 50 MG tablet  Commonly known as: ULTRAM      Take 1 tablet by mouth Every 6 (Six) Hours As Needed for Moderate Pain or Severe Pain.             Where to Get Your Medications      These medications were sent to [facility]  [address] [place] [address]    Hours: Monday through Friday 7:00am to 5:00pm Phone: [phone] ·   clopidogrel 75 MG tablet         INSTRUCTIONS   Activity:   Activity Instructions     Activity as Tolerated      Driving Restrictions      Type of Restriction: Driving    Driving Restrictions: No Driving (Time Limited)    Length: Other    Indicate Length of Restriction: 48 hours    Lifting Restrictions      Type of Restriction: Lifting    Lifting Restrictions: Avoid Straining to Lift    Length of Lifting Restriction: 10 lbs for 1 week          Diet:   Diet Instructions     Diet: Regular      Discharge  Diet: Regular    Also per what Dr. Aragon instructed her to do          Patient instructions not to lift more than 10 pounds for 1 week.  Patient instructions not to soak cath site for 1 week.  Patient given instructions to report signs and symptoms of infection including but not limited to purulent drainage, tenderness, fever greater than 101 degrees.   Patient instructions regarding importance of medication compliance.  Patient was counseled on medications (new and old) with side effects.  Discussed signs symptoms to report.   Should you experience return of chest pain, present to the nearest emergency room.     Special Instructions: Patient instructed to call M.D. or return to ED with worsening shortness of breath, chest pain, fever greater than 101°F or any other medical concerns.    FOLLOW UP   Additional Instructions for the Follow-ups that You Need to Schedule     Discharge Follow-up with Specified Provider: Dr. Carrillo or Nereyda MENDIOLA; 1 Month   As directed      To: Dr. Carrillo or Nereyda MENDIOLA    Follow Up: 1 Month    Follow Up Details: [place]            Follow-up Information     Nereyda Mcconnell APRN Follow up in 1 month(s).    Specialties: Cardiology, Nurse Practitioner  Why: post pci  OFFICE WILL CALL TO SCHEDULE HOSPITAL FOLLOW UP APPOINTMENT  Contact information:  [address]  [place] [id number] [phone]             Jaxon Hedrick MD Follow up.    Specialty: Family Medicine  Why: OFFICE WILL CALL TO SCHEDULE HOSPITAL FOLLOW UP APPOINTMENT  Contact information:  [address]  [address]  [place] [id number]  [phone]                         Follow-up Appointments  Future Appointments   Date Time Provider Department Center   11/14/2022  9:00 AM Traci Packer APRN [facility]   12/27/2022  9:45 AM Roque Carrillo MD [facility] None     Additional Instructions for the Follow-ups that You Need to Schedule     Discharge Follow-up with Specified Provider: Dr. Carrillo  or Nreeyda MENDIOLA; 1 Month   As directed      To: Dr. Carrillo or Nereyda MENDIOLA    Follow Up: 1 Month    Follow Up Details: [place]                PENDING TEST RESULTS AT DISCHARGE          TIME   Time: 33 minutes were spent planning this discharge.              Dr. Carrillo is the attending at time of discharge, He is aware of the patient's status and agrees with the above discharge summary.         This document has been electronically signed by MARLENA North on October 21, 2022 14:32 CDT   Electronically signed by MARLENA oNrth, 10/21/22, 2:32 PM CDT.    I personally saw and examined Umer Frazier after the APRN.  I personally performed a history and physical examination of the patient.  I personally reviewed independent findings and plan of care.  I discussed management with the APRN.  I agree with the APRN's documentation.    No acute events overnight.  The patient denied any chest discomfort or dyspnea at the time of my evaluation on the morning of discharge.  She was evaluated by gastroenterology on the day of discharge; underwent EGD; dilation for benign esophageal stenosis was performed.    Vitals:    10/21/22 1144 10/21/22 1300 10/21/22 1518 10/21/22 1529   BP: 110/49 153/68  157/70   BP Location:  Right arm  Right arm   Patient Position: Lying Lying  Lying   Pulse: 69 80 66 73   Resp: 18 18  18   Temp: 98 °F (36.7 °C) 96.2 °F (35.7 °C)     TempSrc: Temporal Oral     SpO2: 97% 97%  98%   Weight:       Height:         Right groin cath access site was examined.  Right groin pulse was intact.  There was no evidence of ecchymosis or hematoma.  Post PCI labs and ECG were reviewed.  Routine post-cath care instructions were personally reviewed with the patient.  Agree with discharge medications as detailed above.  Recommend outpatient follow-up with cardiology in 4 weeks after hospital discharge.      Electronically signed by Roque Carrillo MD, 11/02/22, 4:43 PM CDT.

## 2022-10-24 ENCOUNTER — TELEPHONE (OUTPATIENT)
Dept: FAMILY MEDICINE CLINIC | Facility: CLINIC | Age: 67
End: 2022-10-24

## 2022-10-28 ENCOUNTER — DOCUMENTATION (OUTPATIENT)
Dept: CARDIAC REHAB | Facility: HOSPITAL | Age: 67
End: 2022-10-28

## 2022-11-13 LAB
QT INTERVAL: 442 MS
QTC INTERVAL: 519 MS

## 2023-04-27 ENCOUNTER — TRANSCRIBE ORDERS (OUTPATIENT)
Dept: PODIATRY | Facility: CLINIC | Age: 68
End: 2023-04-27
Payer: MEDICARE

## 2023-04-27 DIAGNOSIS — M21.969 DEFORMITY OF FOOT, UNSPECIFIED LATERALITY: Primary | ICD-10-CM

## 2023-05-09 ENCOUNTER — OFFICE VISIT (OUTPATIENT)
Dept: PODIATRY | Facility: CLINIC | Age: 68
End: 2023-05-09
Payer: MEDICARE

## 2023-05-09 VITALS
HEIGHT: 63 IN | DIASTOLIC BLOOD PRESSURE: 79 MMHG | OXYGEN SATURATION: 97 % | SYSTOLIC BLOOD PRESSURE: 141 MMHG | BODY MASS INDEX: 20.2 KG/M2 | HEART RATE: 66 BPM | WEIGHT: 114 LBS

## 2023-05-09 DIAGNOSIS — B35.1 ONYCHOMYCOSIS: ICD-10-CM

## 2023-05-09 DIAGNOSIS — M79.674 TOE PAIN, BILATERAL: ICD-10-CM

## 2023-05-09 DIAGNOSIS — L97.819 VARICOSE VEINS OF RIGHT LOWER EXTREMITY WITH ULCER OTHER PART OF LOWER LEG: ICD-10-CM

## 2023-05-09 DIAGNOSIS — R09.89 WEAK PULSE: Primary | ICD-10-CM

## 2023-05-09 DIAGNOSIS — I83.018 VARICOSE VEINS OF RIGHT LOWER EXTREMITY WITH ULCER OTHER PART OF LOWER LEG: ICD-10-CM

## 2023-05-09 DIAGNOSIS — M79.675 TOE PAIN, BILATERAL: ICD-10-CM

## 2023-05-09 DIAGNOSIS — M20.11 VALGUS DEFORMITY OF BOTH GREAT TOES: ICD-10-CM

## 2023-05-09 DIAGNOSIS — L60.2 ONYCHOGRYPHOSIS: ICD-10-CM

## 2023-05-09 DIAGNOSIS — M20.12 VALGUS DEFORMITY OF BOTH GREAT TOES: ICD-10-CM

## 2023-05-09 DIAGNOSIS — R60.0 LOCALIZED EDEMA: ICD-10-CM

## 2023-05-09 NOTE — PROGRESS NOTES
Umer Frazier  1955  67 y.o. female  PCP: Jolie Maravilla:     05/09/2023    Chief Complaint   Patient presents with   • Right Foot - Follow-up, Bunions     Nondiabetic foot care   • Left Foot - Follow-up, Bunions     Nondiabetic foot care    • Right Ankle - Wound Check     Nondiabetic foot care        History of Present Illness    Umer Frazier is a 67 y.o.female presents to the clinic today with several complaints. 1. Right lower leg wound. Pt reports she has been treated in the past by [facility]. No updated vascular studies available for review. Pt's son has been dressing with ACE and feminine pad to collect drainage. 2. Bilateral painful bunions. Pt reports she also has PMH of RA. 3. NDFC with painful toenails.      Past Medical History:   Diagnosis Date   • Arthritis    • Bunion    • Hypothyroidism    • Murmur, heart    • Stroke          Past Surgical History:   Procedure Laterality Date   • CARDIAC CATHETERIZATION N/A 9/8/2022    Procedure: Left and Right Heart Cath;  Surgeon: Roque Carrillo MD;  Location: [facility] CATH INVASIVE LOCATION;  Service: Cardiology;  Laterality: N/A;   • CARDIAC CATHETERIZATION N/A 10/12/2022    Procedure: Stent CONRADO coronary: LAD with/without RCA;  Surgeon: Roque Carrillo MD;  Location: [facility] CATH INVASIVE LOCATION;  Service: Cardiology;  Laterality: N/A;   • CARDIAC CATHETERIZATION  10/12/2022    Procedure: CASE ABORT;  Surgeon: Roque Carrillo MD;  Location: [facility] CATH INVASIVE LOCATION;  Service: Cardiology;;  patient condition   • CARDIAC CATHETERIZATION N/A 10/20/2022    Procedure: Left Heart Cath;  Surgeon: Roque Carrillo MD;  Location: [facility] CATH INVASIVE LOCATION;  Service: Cardiology;  Laterality: N/A;   • CYST REMOVAL     • ENDOSCOPY N/A 10/21/2022    Procedure: ESOPHAGOGASTRODUODENOSCOPY;  Surgeon: Luke Aragon MD;  Location: [facility] ENDOSCOPY;  Service: Gastroenterology;  Laterality: N/A;  balloon dilation to 36FR   • FEMUR SURGERY    "        Family History   Family history unknown: Yes       Allergies   Allergen Reactions   • Folic Acid Unknown - Low Severity     Other reaction(s): flu symtoms   • Nickel Rash       Social History     Socioeconomic History   • Marital status: Single   Tobacco Use   • Smoking status: Former     Types: Cigarettes     Quit date:      Years since quittin.3   • Smokeless tobacco: Never   Vaping Use   • Vaping Use: Never used   Substance and Sexual Activity   • Alcohol use: Yes     Comment: ON OCC.   • Drug use: Never   • Sexual activity: Not Currently         Current Outpatient Medications   Medication Sig Dispense Refill   • aspirin 81 MG EC tablet Take 1 tablet by mouth Daily. 90 tablet 1   • cetirizine (zyrTEC) 10 MG tablet Take 1 tablet by mouth Daily.     • ferrous sulfate 324 (65 Fe) MG tablet delayed-release EC tablet Take 1 tablet by mouth Daily With Breakfast. 30 tablet 0   • levothyroxine (SYNTHROID, LEVOTHROID) 75 MCG tablet Take 1 tablet by mouth Daily.     • methotrexate 2.5 MG tablet Takes every Thursday     • Multiple Vitamins-Minerals (MACULAR HEALTH FORMULA PO) Take 1 tablet/day by mouth Daily.     • multivitamin with minerals tablet tablet Take 1 tablet by mouth Daily.     • sodium chloride 0.65 % nasal spray 2 sprays into the nostril(s) as directed by provider As Needed for Congestion.  12   • traMADol (ULTRAM) 50 MG tablet Take 1 tablet by mouth Every 6 (Six) Hours As Needed for Moderate Pain or Severe Pain. 40 tablet 0     No current facility-administered medications for this visit.       Review of Systems   Constitutional: Negative.  Negative for chills and fever.   Respiratory: Negative.    Cardiovascular: Positive for leg swelling.   Musculoskeletal:        Toe pain   Skin: Positive for wound.   Neurological: Negative.    Psychiatric/Behavioral: Negative.          OBJECTIVE    /79   Pulse 66   Ht 160 cm (63\")   Wt 51.7 kg (114 lb)   SpO2 97%   BMI 20.19 kg/m²     Physical " Exam  Vitals reviewed.   Constitutional:       General: She is not in acute distress.     Appearance: Normal appearance.   HENT:      Head: Normocephalic.   Eyes:      Pupils: Pupils are equal, round, and reactive to light.   Pulmonary:      Effort: No respiratory distress.   Musculoskeletal:         General: Tenderness and deformity present. No signs of injury.      Cervical back: Neck supple.      Right lower leg: Edema present.      Left lower leg: Edema present.      Right foot: Bunion present.      Left foot: Bunion present.        Legs:       Comments: Multiple varicosities to b/l LE; R>L.   Feet:      Right foot:      Toenail Condition: Right toenails are abnormally thick and long. Fungal disease present.     Left foot:      Toenail Condition: Left toenails are abnormally thick and long. Fungal disease present.     Comments: Non-palpable b/l pulses.      Skin:     General: Skin is warm and dry.      Findings: No erythema.   Neurological:      General: No focal deficit present.      Mental Status: She is alert.   Psychiatric:         Mood and Affect: Mood normal.         Behavior: Behavior normal.              Procedures        ASSESSMENT AND PLAN    Diagnoses and all orders for this visit:    1. Weak pulse (Primary)  -     Doppler Ankle Brachial Index Single Level CAR; Future    2. Localized edema  -     Duplex Venous Lower Extremity - Bilateral CAR; Future    3. Varicose veins of right lower extremity with ulcer other part of lower leg    4. Valgus deformity of both great toes    5. Onychomycosis    6. Onychogryphosis    7. Toe pain, bilateral        - Updating vascular studies. Wound care conservative while awaiting studies.  - Nails 1-5 bilateral were debrided in length and thickness with nail nipper and electric  to decrease fungal load and risk of infection. ABN signed by patient.  - Bunion options discussed with pt. Will awaiting results of vascular testing before proceeding with further  intervention.  - All questions answered.  - F/u after vascular testing.            This document has been electronically signed by MARLENA Lima on May 9, 2023 14:59 CDT

## 2023-05-31 ENCOUNTER — TRANSCRIBE ORDERS (OUTPATIENT)
Dept: WOUND CARE | Facility: HOSPITAL | Age: 68
End: 2023-05-31

## 2023-05-31 DIAGNOSIS — L89.159 DECUBITUS ULCER OF COCCYGEAL REGION, UNSPECIFIED ULCER STAGE: Primary | ICD-10-CM

## 2023-06-14 ENCOUNTER — OFFICE VISIT (OUTPATIENT)
Dept: WOUND CARE | Facility: HOSPITAL | Age: 68
End: 2023-06-14
Payer: MEDICARE

## 2023-06-14 ENCOUNTER — OUTSIDE FACILITY SERVICE (OUTPATIENT)
Dept: WOUND CARE | Facility: HOSPITAL | Age: 68
End: 2023-06-14
Payer: MEDICARE

## 2023-06-14 PROCEDURE — G0463 HOSPITAL OUTPT CLINIC VISIT: HCPCS

## 2023-06-15 ENCOUNTER — HOME HEALTH ADMISSION (OUTPATIENT)
Dept: HOME HEALTH SERVICES | Facility: HOME HEALTHCARE | Age: 68
End: 2023-06-15
Payer: MEDICARE

## 2023-07-26 ENCOUNTER — OUTSIDE FACILITY SERVICE (OUTPATIENT)
Dept: WOUND CARE | Facility: HOSPITAL | Age: 68
End: 2023-07-26
Payer: MEDICARE

## 2023-07-26 ENCOUNTER — OFFICE VISIT (OUTPATIENT)
Dept: WOUND CARE | Facility: HOSPITAL | Age: 68
End: 2023-07-26
Payer: MEDICARE

## 2023-07-26 PROCEDURE — G0463 HOSPITAL OUTPT CLINIC VISIT: HCPCS

## 2023-08-30 ENCOUNTER — OUTSIDE FACILITY SERVICE (OUTPATIENT)
Dept: WOUND CARE | Facility: HOSPITAL | Age: 68
End: 2023-08-30
Payer: MEDICARE

## 2023-08-30 ENCOUNTER — OFFICE VISIT (OUTPATIENT)
Dept: WOUND CARE | Facility: HOSPITAL | Age: 68
End: 2023-08-30
Payer: MEDICARE

## 2023-08-30 PROCEDURE — G0463 HOSPITAL OUTPT CLINIC VISIT: HCPCS

## 2023-09-27 ENCOUNTER — OFFICE VISIT (OUTPATIENT)
Dept: WOUND CARE | Facility: HOSPITAL | Age: 68
End: 2023-09-27
Payer: MEDICARE

## 2023-09-27 ENCOUNTER — OFFICE VISIT (OUTPATIENT)
Dept: GASTROENTEROLOGY | Facility: CLINIC | Age: 68
End: 2023-09-27
Payer: MEDICARE

## 2023-09-27 VITALS
HEIGHT: 63 IN | HEART RATE: 62 BPM | DIASTOLIC BLOOD PRESSURE: 80 MMHG | BODY MASS INDEX: 17.47 KG/M2 | SYSTOLIC BLOOD PRESSURE: 132 MMHG | WEIGHT: 98.6 LBS

## 2023-09-27 DIAGNOSIS — R13.19 ESOPHAGEAL DYSPHAGIA: Primary | ICD-10-CM

## 2023-09-27 DIAGNOSIS — Z87.19 HISTORY OF ESOPHAGEAL STRICTURE: ICD-10-CM

## 2023-09-27 PROBLEM — K22.2 STRICTURE AND STENOSIS OF ESOPHAGUS: Status: ACTIVE | Noted: 2023-09-27

## 2023-09-27 PROCEDURE — 3079F DIAST BP 80-89 MM HG: CPT | Performed by: NURSE PRACTITIONER

## 2023-09-27 PROCEDURE — 3075F SYST BP GE 130 - 139MM HG: CPT | Performed by: NURSE PRACTITIONER

## 2023-09-27 PROCEDURE — 99214 OFFICE O/P EST MOD 30 MIN: CPT | Performed by: NURSE PRACTITIONER

## 2023-09-27 PROCEDURE — G0463 HOSPITAL OUTPT CLINIC VISIT: HCPCS

## 2023-09-27 RX ORDER — DEXTROSE AND SODIUM CHLORIDE 5; .45 G/100ML; G/100ML
30 INJECTION, SOLUTION INTRAVENOUS CONTINUOUS PRN
OUTPATIENT
Start: 2023-09-27

## 2023-09-27 RX ORDER — CHOLECALCIFEROL (VITAMIN D3) 125 MCG
TABLET ORAL
COMMUNITY

## 2023-09-27 RX ORDER — ATORVASTATIN CALCIUM 40 MG/1
40 TABLET, FILM COATED ORAL DAILY
COMMUNITY

## 2023-09-27 NOTE — PROGRESS NOTES
Chief Complaint   Patient presents with    Difficulty Swallowing       Subjective    Umer Frazier is a 68 y.o. female. she is here today for follow-up.                                                                  Assessment & Plan                                     1. Esophageal dysphagia    2. History of esophageal stricture    Plan; schedule patient for EGD with dilation due to dysphagia and esophageal stricture.  Follow-up after test return to office sooner if needed    Follow-up: Return in about 4 weeks (around 10/25/2023) for Recheck, After test.     HPI  68-year-old female presents to discuss recurrent dysphagia.  She has concurrent medical history of mitral valve regurgitation, coronary artery disease status post PCI, HTN, hypothyroidism, rheumatoid arthritis.  She was seen by Dr. Aragon while hospitalized October 2022 underwent EGD with stricture in mid esophagus that was dilated to 36 [place].  Repeat was planned after she was able to come off DAPT.  She was recommended to stay on DAPT for 6 months at minimum however she stopped after 1 to 2 months due to recurrent nosebleeds.  She was recently hospitalized at [facility] August 2023 for methotrexate toxicity.  Reports she is establishing care with new rheumatologist and is going to start Remicade but had to wait until wound on her coccyx had healed states that has finally healed all the way she is able to start medication.  Reports she has been working with speech therapy on exercises to strengthen swallow has helped. they recommended GI evaluation with EGD for optimal improvement.  Review of Systems  Review of Systems   Constitutional:  Positive for unexpected weight change (down 5 pounds cannot keep anything down). Negative for activity change, appetite change, chills, diaphoresis, fatigue and fever.   HENT:  Positive for trouble  "swallowing. Negative for sore throat.    Respiratory:  Negative for shortness of breath.    Gastrointestinal:  Negative for abdominal distention, abdominal pain, anal bleeding, blood in stool, constipation, diarrhea, nausea, rectal pain and vomiting.   Musculoskeletal:  Negative for arthralgias.   Skin:  Negative for pallor.   Neurological:  Negative for light-headedness.     /80 (BP Location: Left arm)   Pulse 62   Ht 160 cm (63\")   Wt 44.7 kg (98 lb 9.6 oz)   BMI 17.47 kg/m²     Objective      Physical Exam  Constitutional:       General: She is not in acute distress.     Appearance: Normal appearance. She is normal weight. She is not ill-appearing or toxic-appearing.   Pulmonary:      Effort: Pulmonary effort is normal.   Abdominal:      General: Abdomen is flat. Bowel sounds are normal. There is no distension.      Palpations: Abdomen is soft. There is no mass.      Tenderness: There is no abdominal tenderness.   Musculoskeletal:         General: Deformity present.      Comments: RA   Neurological:      Mental Status: She is alert.             The following portions of the patient's history were reviewed and updated as appropriate:   Past Medical History:   Diagnosis Date    Arthritis     Bunion     Hypothyroidism     Murmur, heart     Stroke      Past Surgical History:   Procedure Laterality Date    CARDIAC CATHETERIZATION N/A 9/8/2022    Procedure: Left and Right Heart Cath;  Surgeon: Roque Carrillo MD;  Location: [facility] INVASIVE LOCATION;  Service: Cardiology;  Laterality: N/A;    CARDIAC CATHETERIZATION N/A 10/12/2022    Procedure: Stent CONRADO coronary: LAD with/without RCA;  Surgeon: Roque Carrillo MD;  Location: [facility] CATH INVASIVE LOCATION;  Service: Cardiology;  Laterality: N/A;    CARDIAC CATHETERIZATION  10/12/2022    Procedure: CASE ABORT;  Surgeon: Roque Carrillo MD;  Location: [facility] CATH INVASIVE LOCATION;  Service: Cardiology;;  patient condition    CARDIAC " CATHETERIZATION N/A 10/20/2022    Procedure: Left Heart Cath;  Surgeon: Roque Carrillo MD;  Location: [facility] CATH INVASIVE LOCATION;  Service: Cardiology;  Laterality: N/A;    CYST REMOVAL      ENDOSCOPY N/A 10/21/2022    Procedure: ESOPHAGOGASTRODUODENOSCOPY;  Surgeon: Luke Aragon MD;  Location: [facility] ENDOSCOPY;  Service: Gastroenterology;  Laterality: N/A;  balloon dilation to 36FR    FEMUR SURGERY       Family History   Family history unknown: Yes     OB History    No obstetric history on file.       Allergies   Allergen Reactions    Folic Acid Unknown - Low Severity     Other reaction(s): flu symtoms    Nickel Rash     Social History     Socioeconomic History    Marital status: Single   Tobacco Use    Smoking status: Former     Types: Cigarettes     Quit date:      Years since quittin.7    Smokeless tobacco: Never   Vaping Use    Vaping Use: Never used   Substance and Sexual Activity    Alcohol use: Yes     Comment: ON OCC.    Drug use: Never    Sexual activity: Not Currently     Current Medications:  Prior to Admission medications    Medication Sig Start Date End Date Taking? Authorizing Provider   aspirin 81 MG EC tablet Take 1 tablet by mouth Daily. 22  Yes Roque Carrillo MD   atorvastatin (LIPITOR) 40 MG tablet Take 1 tablet by mouth Daily.   Yes ProviderAung MD   cetirizine (zyrTEC) 10 MG tablet Take 1 tablet by mouth Daily.   Yes Aung Dunbar MD   Ergocalciferol (Vitamin D2) 50 MCG (2000 UT) tablet Take  by mouth.   Yes Aung Dunbar MD   ferrous sulfate 324 (65 Fe) MG tablet delayed-release EC tablet Take 1 tablet by mouth Daily With Breakfast. 10/14/22  Yes Jaxon Friedman MD   levothyroxine (SYNTHROID, LEVOTHROID) 75 MCG tablet Take 1 tablet by mouth Daily. 22  Yes Adalid Torres MD   Multiple Vitamins-Minerals (MACULAR HEALTH FORMULA PO) Take 1 tablet/day by mouth Daily.   Yes Aung Dunbar MD   sodium chloride 0.65 % nasal spray  2 sprays into the nostril(s) as directed by provider As Needed for Congestion. 10/14/22  Yes Jaxon Friedman MD   traMADol (ULTRAM) 50 MG tablet Take 1 tablet by mouth Every 6 (Six) Hours As Needed for Moderate Pain or Severe Pain. 9/22/22  Yes Jolie Maravilla APRN   methotrexate 2.5 MG tablet Takes every Thursday 2/24/22 9/27/23  Adalid Torres MD   multivitamin with minerals tablet tablet Take 1 tablet by mouth Daily.  9/27/23  Provider, MD Aung     Orders placed during this encounter include:  Orders Placed This Encounter   Procedures    Obtain Informed Consent     Standing Status:   Future     Order Specific Question:   Informed Consent Given For     Answer:   ESOPHAGOGASTRODUODENOSCOPY possible dilation     ESOPHAGOGASTRODUODENOSCOPY possible dilation (N/A)  No orders of the defined types were placed in this encounter.        Review and/or summary of lab tests, radiology, procedures, medications. Review and summary of old records and obtaining of history. The risks and benefits of my recommendations, as well as other treatment options were discussed . Any questions/concerned were answered. Patient voiced understanding and agreement.          This document has been electronically signed by MARLENA Wade on September 27, 2023 12:58 CDT                                               Results for orders placed or performed during the hospital encounter of 05/26/23   Doppler Ankle Brachial Index Single Level CAR   Result Value Ref Range    Target HR (85%) 128 bpm    Max. Pred. HR (100%) 151 bpm    Upper arterial right arm brachial sys max 158 mmHg    Upper arterial left arm brachial sys max 149 mmHg    RIGHT POST TIBIAL SYS      LEFT POST TIBIAL SYS      RIGHT DORSALIS PEDIS SYS      LEFT DORSALIS PEDIS SYS      RIGHT KRYSTAL RATIO 0.97     LEFT KRYSTAL RATIO 0.92    Results for orders placed or performed during the hospital encounter of 05/26/23   Duplex Venous Lower Extremity -  Bilateral CAR   Result Value Ref Range    Target HR (85%) 128 bpm    Max. Pred. HR (100%) 151 bpm   Results for orders placed or performed during the hospital encounter of 10/20/22   O2 Sat Run-Right Ventricle    Specimen: Blood   Result Value Ref Range    Oxygen Saturation, Right Ventricle 94.9 94 - 100 %   O2 Sat Run-Right Atrium    Specimen: Blood   Result Value Ref Range    Oxygen Saturation, Right Atrium 93.2 (L) 94 - 100 %   O2 Sat Run-Pulmonary Artery    Specimen: Blood   Result Value Ref Range    Oxygen Saturation, Pulmonary Artery 96.2 %   O2 Sat Run-Aorta    Specimen: Blood   Result Value Ref Range    Oxygen Saturation, Aorta >100 %   Protime-INR    Specimen: Blood   Result Value Ref Range    Protime 17.8 (H) 11.1 - 15.3 Seconds    INR 1.48 (H) 0.80 - 1.20   CBC (No Diff)    Specimen: Blood   Result Value Ref Range    WBC 5.57 3.40 - 10.80 10*3/mm3    RBC 3.62 (L) 3.77 - 5.28 10*6/mm3    Hemoglobin 8.1 (L) 12.0 - 15.9 g/dL    Hematocrit 25.8 (L) 34.0 - 46.6 %    MCV 71.3 (L) 79.0 - 97.0 fL    MCH 22.4 (L) 26.6 - 33.0 pg    MCHC 31.4 (L) 31.5 - 35.7 g/dL    RDW 22.6 (H) 12.3 - 15.4 %    RDW-SD 56.7 (H) 37.0 - 54.0 fl    MPV      Platelets 150 140 - 450 10*3/mm3   CBC (No Diff)    Specimen: Blood   Result Value Ref Range    WBC 5.64 3.40 - 10.80 10*3/mm3    RBC 4.21 3.77 - 5.28 10*6/mm3    Hemoglobin 9.3 (L) 12.0 - 15.9 g/dL    Hematocrit 30.2 (L) 34.0 - 46.6 %    MCV 71.7 (L) 79.0 - 97.0 fL    MCH 22.1 (L) 26.6 - 33.0 pg    MCHC 30.8 (L) 31.5 - 35.7 g/dL    RDW 23.0 (H) 12.3 - 15.4 %    RDW-SD 58.0 (H) 37.0 - 54.0 fl    MPV      Platelets 173 140 - 450 10*3/mm3   Basic Metabolic Panel    Specimen: Blood   Result Value Ref Range    Glucose 80 65 - 99 mg/dL    BUN 9 8 - 23 mg/dL    Creatinine 0.72 0.57 - 1.00 mg/dL    Sodium 140 136 - 145 mmol/L    Potassium 3.4 (L) 3.5 - 5.2 mmol/L    Chloride 107 98 - 107 mmol/L    CO2 25.0 22.0 - 29.0 mmol/L    Calcium 7.7 (L) 8.6 - 10.5 mg/dL    BUN/Creatinine Ratio 12.5  7.0 - 25.0    Anion Gap 8.0 5.0 - 15.0 mmol/L    eGFR 90.6 >60.0 mL/min/1.73   Basic Metabolic Panel    Specimen: Blood   Result Value Ref Range    Glucose 96 65 - 99 mg/dL    BUN 11 8 - 23 mg/dL    Creatinine 0.70 0.57 - 1.00 mg/dL    Sodium 138 136 - 145 mmol/L    Potassium 3.6 3.5 - 5.2 mmol/L    Chloride 105 98 - 107 mmol/L    CO2 25.0 22.0 - 29.0 mmol/L    Calcium 8.1 (L) 8.6 - 10.5 mg/dL    BUN/Creatinine Ratio 15.7 7.0 - 25.0    Anion Gap 8.0 5.0 - 15.0 mmol/L    eGFR 93.8 >60.0 mL/min/1.73   ECG 12 Lead   Result Value Ref Range    QT Interval 442 ms    QTC Interval 519 ms   Results for orders placed or performed during the hospital encounter of 10/12/22   CBC Auto Differential    Specimen: Blood   Result Value Ref Range    WBC 5.03 3.40 - 10.80 10*3/mm3    RBC 4.10 3.77 - 5.28 10*6/mm3    Hemoglobin 8.9 (L) 12.0 - 15.9 g/dL    Hematocrit 29.6 (L) 34.0 - 46.6 %    MCV 72.2 (L) 79.0 - 97.0 fL    MCH 21.7 (L) 26.6 - 33.0 pg    MCHC 30.1 (L) 31.5 - 35.7 g/dL    RDW 22.5 (H) 12.3 - 15.4 %    RDW-SD 56.7 (H) 37.0 - 54.0 fl    MPV      Platelets 120 (L) 140 - 450 10*3/mm3    Neutrophil % 46.9 42.7 - 76.0 %    Lymphocyte % 36.0 19.6 - 45.3 %    Monocyte % 12.5 (H) 5.0 - 12.0 %    Eosinophil % 3.4 0.3 - 6.2 %    Basophil % 1.0 0.0 - 1.5 %    Immature Grans % 0.2 0.0 - 0.5 %    Neutrophils, Absolute 2.36 1.70 - 7.00 10*3/mm3    Lymphocytes, Absolute 1.81 0.70 - 3.10 10*3/mm3    Monocytes, Absolute 0.63 0.10 - 0.90 10*3/mm3    Eosinophils, Absolute 0.17 0.00 - 0.40 10*3/mm3    Basophils, Absolute 0.05 0.00 - 0.20 10*3/mm3    Immature Grans, Absolute 0.01 0.00 - 0.05 10*3/mm3    nRBC 0.0 0.0 - 0.2 /100 WBC   Lavender Top   Result Value Ref Range    Extra Tube hold for add-on    Iron Profile    Specimen: Blood   Result Value Ref Range    Iron 12 (L) 37 - 145 mcg/dL    Iron Saturation (TSAT) 3 (L) 20 - 50 %    Transferrin 282 200 - 360 mg/dL    TIBC 420 298 - 536 mcg/dL   Protime-INR    Specimen: Blood   Result Value Ref  Range    Protime 16.6 (H) 11.1 - 15.3 Seconds    INR 1.35 (H) 0.80 - 1.20   CBC (No Diff)    Specimen: Blood   Result Value Ref Range    WBC 5.40 3.40 - 10.80 10*3/mm3    RBC 4.19 3.77 - 5.28 10*6/mm3    Hemoglobin 9.1 (L) 12.0 - 15.9 g/dL    Hematocrit 30.1 (L) 34.0 - 46.6 %    MCV 71.8 (L) 79.0 - 97.0 fL    MCH 21.7 (L) 26.6 - 33.0 pg    MCHC 30.2 (L) 31.5 - 35.7 g/dL    RDW 22.7 (H) 12.3 - 15.4 %    RDW-SD 55.8 (H) 37.0 - 54.0 fl    MPV      Platelets 140 140 - 450 10*3/mm3     *Note: Due to a large number of results and/or encounters for the requested time period, some results have not been displayed. A complete set of results can be found in Results Review.

## (undated) DEVICE — CATH DIAG EXPO .045 MPA2 5F 100CM

## (undated) DEVICE — ELECTRODE,RT,STRESS,FOAM,50PK: Brand: MEDLINE

## (undated) DEVICE — PERCLOSE PROGLIDE™ SUTURE-MEDIATED CLOSURE SYSTEM: Brand: PERCLOSE PROGLIDE™

## (undated) DEVICE — VIPERSLIDE, 100ML BAG, 10 PK: Brand: VIPERSLIDE

## (undated) DEVICE — A2000 MULTI-USE SYRINGE KIT, P/N 701277-003KIT CONTENTS: 100ML CONTRAST RESERVOIR AND TUBING WITH CONTRAST SPIKE AND CLAMP: Brand: A2000 MULTI-USE SYRINGE KIT

## (undated) DEVICE — INTRO SHEATH ULTIMUM ACT 5F

## (undated) DEVICE — DEV INFL CRE STERIFLATE 60CC DISP

## (undated) DEVICE — MODEL BT2000 P/N 700287-012KIT CONTENTS: MANIFOLD WITH SALINE AND CONTRAST PORTS, SALINE TUBING WITH SPIKE AND HAND SYRINGE, TRANSDUCER: Brand: BT2000 AUTOMATED MANIFOLD KIT

## (undated) DEVICE — CANN SMPL SOFTECH BIFLO ETCO2 A/M 7FT

## (undated) DEVICE — ST CVR PROB PULLUP ULTRASND 5X48IN

## (undated) DEVICE — 6F .070 XB LAD 3.5 100CM: Brand: VISTA BRITE TIP

## (undated) DEVICE — ANGIO-SEAL VIP VASCULAR CLOSURE DEVICE: Brand: ANGIO-SEAL

## (undated) DEVICE — ARTERIAL NEEDLE: Brand: UNBRANDED

## (undated) DEVICE — RADIFOCUS OPTITORQUE ANGIOGRAPHIC CATHETER: Brand: OPTITORQUE

## (undated) DEVICE — DEV INFL MONARCH 20ML

## (undated) DEVICE — RUNTHROUGH NS EXTRA FLOPPY PTCA GUIDEWIRE: Brand: RUNTHROUGH

## (undated) DEVICE — INTRO SHEATH ENGAGE W/50 GW .038 8F12

## (undated) DEVICE — GLIDESHEATH SLENDER STAINLESS STEEL KIT: Brand: GLIDESHEATH SLENDER

## (undated) DEVICE — CATH DIAG EXPO .045 AL1 5F 100CM

## (undated) DEVICE — INTRO SHEATH ART/FEM ENGAGE .038 6F12CM

## (undated) DEVICE — CATH DIAG IMPULSE M/ PK 145 5FR

## (undated) DEVICE — CATH THERMODIL SWANGANZ 4LUM 6F 110CM

## (undated) DEVICE — DILATOR CONTRL RADL 12/15MM 8CM BX5

## (undated) DEVICE — CATH IMG IVUS EAGLE EYE RAPDXNG PLAT SHT/TP 5F .014IN

## (undated) DEVICE — PK CATH LAB 60

## (undated) DEVICE — GW PERIPH GUIDERIGHT STD/J/TP PTFE/PCOAT SS 0.038IN 5X150CM

## (undated) DEVICE — GW PERIPH GUIDERIGHT STD/EXCHNG/J/TIP SS 0.035IN 5X260CM

## (undated) DEVICE — BITEBLOCK ENDO W/STRAP 60F A/ LF DISP

## (undated) DEVICE — GUIDEWIRE, VIPERWIRE ADAVNCE CORONARY FLOPPY, .012: DIA, .014" SPRING TIP, 1 PACK 325 CM: Brand: DIAMONDBACK CORONARY

## (undated) DEVICE — COPILOT KIT INCLUDES BLEEDBACK CONTROL VALVE / GUIDE WIRE INTRODUCER / TORQUE DEVICE: Brand: ACCESSORIES

## (undated) DEVICE — TBG HI PRESSURE 500PSI 20IN

## (undated) DEVICE — DIAMONDBACK CORONARY, CLASSIC CROWN, 1.25MM, 135CM SHAFT: Brand: DIAMONDBACK CORONARY

## (undated) DEVICE — GW PERIPH GUIDERIGHT STD/EXCHNG/J/TIP SS 0.038IN 5X260CM

## (undated) DEVICE — BALN PTCA TAKERU RX NC 2.5X15MM

## (undated) DEVICE — CATH DIAG EXPO .056 FR4 6F 100CM

## (undated) DEVICE — BALN PTCA TAKERU RX NC 3X15MM

## (undated) DEVICE — PINNACLE INTRODUCER SHEATH: Brand: PINNACLE

## (undated) DEVICE — TREK CORONARY DILATATION CATHETER 2.50 MM X 12 MM / RAPID-EXCHANGE: Brand: TREK

## (undated) DEVICE — TREK CORONARY DILATATION CATHETER 2.50 MM X 15 MM / RAPID-EXCHANGE: Brand: TREK

## (undated) DEVICE — BALN PTCA TAKERU RX NC 3.5X12MM

## (undated) DEVICE — GLIDESHEATH BASIC HYDROPHILIC COATED INTRODUCER SHEATH: Brand: GLIDESHEATH